# Patient Record
Sex: FEMALE | Race: WHITE | NOT HISPANIC OR LATINO | Employment: FULL TIME | URBAN - METROPOLITAN AREA
[De-identification: names, ages, dates, MRNs, and addresses within clinical notes are randomized per-mention and may not be internally consistent; named-entity substitution may affect disease eponyms.]

---

## 2023-09-19 ENCOUNTER — LAB (OUTPATIENT)
Dept: LAB | Facility: LAB | Age: 33
End: 2023-09-19
Payer: COMMERCIAL

## 2023-09-19 LAB — TOBACCO SCREEN, URINE: NEGATIVE

## 2023-11-09 PROBLEM — N85.00 ENDOMETRIAL HYPERPLASIA: Status: ACTIVE | Noted: 2023-11-09

## 2023-11-09 PROBLEM — E66.01 OBESITY, MORBID (MULTI): Status: ACTIVE | Noted: 2023-11-09

## 2023-11-09 PROBLEM — E28.2 POLYCYSTIC OVARIAN SYNDROME: Status: ACTIVE | Noted: 2023-11-09

## 2023-11-09 PROBLEM — R93.89 INCREASED ENDOMETRIAL STRIPE THICKNESS: Status: ACTIVE | Noted: 2023-11-09

## 2023-11-09 PROBLEM — N91.5 OLIGOMENORRHEA: Status: ACTIVE | Noted: 2023-11-09

## 2023-11-09 PROBLEM — R63.5 WEIGHT GAIN, ABNORMAL: Status: ACTIVE | Noted: 2023-11-09

## 2023-11-09 RX ORDER — NORETHINDRONE 5 MG/1
1 TABLET ORAL DAILY
COMMUNITY
Start: 2021-07-12 | End: 2023-11-10 | Stop reason: WASHOUT

## 2023-11-09 RX ORDER — LETROZOLE 2.5 MG/1
1 TABLET, FILM COATED ORAL DAILY
COMMUNITY
Start: 2021-07-12 | End: 2023-11-10 | Stop reason: WASHOUT

## 2023-11-09 RX ORDER — MULTIVIT-MIN/IRON FUM/FOLIC AC 7.5 MG-4
1 TABLET ORAL DAILY
COMMUNITY

## 2023-11-09 RX ORDER — LATANOPROST 50 UG/ML
SOLUTION/ DROPS OPHTHALMIC
COMMUNITY
Start: 2023-05-24

## 2023-11-09 RX ORDER — LEVOTHYROXINE SODIUM 75 UG/1
75 CAPSULE ORAL
COMMUNITY
End: 2023-11-10 | Stop reason: WASHOUT

## 2023-11-10 ENCOUNTER — LAB (OUTPATIENT)
Dept: LAB | Facility: LAB | Age: 33
End: 2023-11-10
Payer: COMMERCIAL

## 2023-11-10 ENCOUNTER — OFFICE VISIT (OUTPATIENT)
Dept: OBSTETRICS AND GYNECOLOGY | Facility: CLINIC | Age: 33
End: 2023-11-10
Payer: COMMERCIAL

## 2023-11-10 VITALS
BODY MASS INDEX: 41.95 KG/M2 | WEIGHT: 293 LBS | DIASTOLIC BLOOD PRESSURE: 70 MMHG | SYSTOLIC BLOOD PRESSURE: 110 MMHG | HEIGHT: 70 IN

## 2023-11-10 DIAGNOSIS — Z32.02 PREGNANCY TEST NEGATIVE: ICD-10-CM

## 2023-11-10 DIAGNOSIS — L68.0 HIRSUTISM: ICD-10-CM

## 2023-11-10 DIAGNOSIS — L70.9 ACNE, UNSPECIFIED ACNE TYPE: ICD-10-CM

## 2023-11-10 DIAGNOSIS — N85.02 ATYPICAL ENDOMETRIAL HYPERPLASIA: ICD-10-CM

## 2023-11-10 DIAGNOSIS — N93.9 ABNORMAL UTERINE BLEEDING (AUB): ICD-10-CM

## 2023-11-10 DIAGNOSIS — Z12.4 CERVICAL CANCER SCREENING: ICD-10-CM

## 2023-11-10 DIAGNOSIS — E28.2 PCOS (POLYCYSTIC OVARIAN SYNDROME): Primary | ICD-10-CM

## 2023-11-10 LAB
ERYTHROCYTE [DISTWIDTH] IN BLOOD BY AUTOMATED COUNT: 12.5 % (ref 11.5–14.5)
HCT VFR BLD AUTO: 42.1 % (ref 36–46)
HGB BLD-MCNC: 13.6 G/DL (ref 12–16)
MCH RBC QN AUTO: 29 PG (ref 26–34)
MCHC RBC AUTO-ENTMCNC: 32.3 G/DL (ref 32–36)
MCV RBC AUTO: 90 FL (ref 80–100)
NRBC BLD-RTO: 0 /100 WBCS (ref 0–0)
PLATELET # BLD AUTO: 427 X10*3/UL (ref 150–450)
PREGNANCY TEST URINE, POC: NEGATIVE
RBC # BLD AUTO: 4.69 X10*6/UL (ref 4–5.2)
TSH SERPL-ACNC: 2.13 MIU/L (ref 0.44–3.98)
WBC # BLD AUTO: 12.5 X10*3/UL (ref 4.4–11.3)

## 2023-11-10 PROCEDURE — 36415 COLL VENOUS BLD VENIPUNCTURE: CPT

## 2023-11-10 PROCEDURE — 84146 ASSAY OF PROLACTIN: CPT

## 2023-11-10 PROCEDURE — 82627 DEHYDROEPIANDROSTERONE: CPT

## 2023-11-10 PROCEDURE — 84402 ASSAY OF FREE TESTOSTERONE: CPT

## 2023-11-10 PROCEDURE — 88175 CYTOPATH C/V AUTO FLUID REDO: CPT

## 2023-11-10 PROCEDURE — 81025 URINE PREGNANCY TEST: CPT | Performed by: OBSTETRICS & GYNECOLOGY

## 2023-11-10 PROCEDURE — 84443 ASSAY THYROID STIM HORMONE: CPT

## 2023-11-10 PROCEDURE — 99214 OFFICE O/P EST MOD 30 MIN: CPT | Performed by: OBSTETRICS & GYNECOLOGY

## 2023-11-10 PROCEDURE — 83036 HEMOGLOBIN GLYCOSYLATED A1C: CPT

## 2023-11-10 PROCEDURE — 83498 ASY HYDROXYPROGESTERONE 17-D: CPT

## 2023-11-10 PROCEDURE — 87624 HPV HI-RISK TYP POOLED RSLT: CPT

## 2023-11-10 PROCEDURE — 85027 COMPLETE CBC AUTOMATED: CPT

## 2023-11-10 PROCEDURE — 1036F TOBACCO NON-USER: CPT | Performed by: OBSTETRICS & GYNECOLOGY

## 2023-11-10 RX ORDER — ALBUTEROL SULFATE 90 UG/1
AEROSOL, METERED RESPIRATORY (INHALATION)
COMMUNITY
Start: 2023-08-16

## 2023-11-10 NOTE — PROGRESS NOTES
Suze Cohen is a 32 y.o. G0 presents for AUB and annual GYN well woman exam.     AUB: Known PCOS and atypical endometrial hyperplasia.   Bleeding on and off since 8/2023.   BMI 51  Known PCOS and atypical endometrial hyperplasia. Previously had IUD for management of endometrial hyperplasia. She had it removed 7/2021 as she wished to become pregnant.  She was to follow-up in office for continued management if she did not become pregnant.    Last EMB 5/2021 inactive endometrium.   Pt with atypical hyperplasia on EMB and subsequent D&C pathology was the same.   s/p D&C 6/19/19 for further characterization and pathology was the same:  s/p GYN oncology visit with Dr. Hernandez with recs for Mirena IUD (placed 7/2019) and serial EMB's q6 months unless  she becomes symptomatic with concerning bleeding and will be for EMB at that time  Mirena IUD placed 7/1/19.   Per Dr. Hernandez's recommendations, she is for EMB at 4 months and if pathology is reassuring, she will be   serial EMBs negative: 11/2019, 4/2020, 10/2020, 5/2021      Annual GYN well woman exam:  - last pap, pap hx: 4/2019 negative. No h/o abnormal Paps. collected  - last mammogram, breast hx: N/A  - breast abnormalities: negative for lumps, skin changes, nipple discharge, pain     OB hx: Nulligravida  GYN hx:   - menarche, menstrual pattern, LMP: AUB  - Sexual activity/issues, STI protection/history, birth control: Sexually active without issues. No concern for STIs  - vaginal/vulvar lesions, irritation, or abnormal discharge: no  - HPV vaccine: complete  FH: No GYN related cancers including breast, ovarian, endometrial, cervical, or colon cancer.      ROS notable for ***  10 point ROS negative except as listed above.     Physical Exam  Constitutional:       Appearance: Normal appearance.   HENT:      Head: Normocephalic and atraumatic.   Eyes:      Extraocular Movements: Extraocular movements intact.      Conjunctiva/sclera: Conjunctivae normal.       Pupils: Pupils are equal, round, and reactive to light.   Pulmonary:      Effort: Pulmonary effort is normal.   Chest:   Breasts:     Right: Normal.      Left: Normal.   Abdominal:      General: Abdomen is flat.      Palpations: Abdomen is soft.   Genitourinary:     General: Normal vulva.      Vagina: Normal.      Cervix: Normal.      Uterus: Normal.       Adnexa: Right adnexa normal and left adnexa normal.   Lymphadenopathy:      Upper Body:      Left upper body: No supraclavicular adenopathy.   Skin:     General: Skin is warm and dry.   Neurological:      General: No focal deficit present.      Mental Status: She is alert.   Psychiatric:         Mood and Affect: Mood normal.         Behavior: Behavior normal.         Thought Content: Thought content normal.         Judgment: Judgment normal.       Assessment/Plan   AUB: known PCOS, h/o atypical endometrial hyperplasia  -AUB labs ordered  -Needs repeat EMB.  Patient to schedule ASAP  -She changes her insurance with a job change this month.    Annual GYN well woman exam:  - last pap, pap hx: 4/2019 negative. No h/o abnormal Paps. collected  - last mammogram, breast hx: N/A  - breast exam negative today. Discussed breast self awareness.     Scribe Attestation  By signing my name below, ILalitha , [place]   attest that this documentation has been prepared under the direction and in the presence of Aureliano Llamas MD.

## 2023-11-10 NOTE — PROGRESS NOTES
Suze Cohen is a 32 y.o. G0 presents for AUB and annual GYN well woman exam.     AUB:  Bleeding on and off since 8/2023. She bleed every day for a month. A few nights she woke up and bled to the sheets. Sometimes she has to wear a pad and other days she did not need to wear anything. She did not have a period prior to August. She hopes to become pregnant. Last sexual intercourse was today. She was to follow-up in office for continued management if she did not become pregnant.   BMI 51    Known PCOS and atypical endometrial hyperplasia. Previously had IUD for management of endometrial hyperplasia. She had it removed 7/2021 as she wished to become pregnant.     Last EMB 5/2021 inactive endometrium.   Pt with atypical hyperplasia on EMB and subsequent D&C pathology was the same.   s/p D&C 6/19/19 for further characterization and pathology was the same:  s/p GYN oncology visit with Dr. Hernandez with recs for Mirena IUD (placed 7/2019) and serial EMB's q6 months unless  she becomes symptomatic with concerning bleeding and will be for EMB at that time  Mirena IUD placed 7/1/19.   Per Dr. Hernandez's recommendations, she is for EMB at 4 months and if pathology is reassuring, she will be   serial EMBs negative: 11/2019, 4/2020, 10/2020, 5/2021      Annual GYN well woman exam:  - last pap, pap hx: 4/2019 negative. No h/o abnormal Paps. collected  - last mammogram, breast hx: N/A  - breast abnormalities: negative for lumps, skin changes, nipple discharge, pain     OB hx: Nulligravida  GYN hx:   - menarche, menstrual pattern, LMP: AUB  - Sexual activity/issues, STI protection/history, birth control: Sexually active without issues. No concern for STIs  - vaginal/vulvar lesions, irritation, or abnormal discharge: no  - HPV vaccine: complete  FH: No GYN related cancers including breast, ovarian, endometrial, cervical, or colon cancer.      ROS notable for weight gain, fatigue, anxiety, depression and hair loss.   10 point  "ROS negative except as listed above.   /70   Ht 1.778 m (5' 10\")   Wt (!) 162 kg (358 lb)   LMP  (LMP Unknown)   BMI 51.37 kg/m²    Physical Exam  Constitutional:       Appearance: Normal appearance.   HENT:      Head: Normocephalic and atraumatic.   Eyes:      Extraocular Movements: Extraocular movements intact.      Conjunctiva/sclera: Conjunctivae normal.      Pupils: Pupils are equal, round, and reactive to light.   Pulmonary:      Effort: Pulmonary effort is normal.   Chest:   Breasts:     Right: Normal.      Left: Normal.   Abdominal:      General: Abdomen is flat.      Palpations: Abdomen is soft.   Genitourinary:     General: Normal vulva.      Vagina: Normal.      Cervix: Normal.      Uterus: Normal.       Adnexa: Right adnexa normal and left adnexa normal.   Lymphadenopathy:      Upper Body:      Left upper body: No supraclavicular adenopathy.   Skin:     General: Skin is warm and dry.   Neurological:      General: No focal deficit present.      Mental Status: She is alert.   Psychiatric:         Mood and Affect: Mood normal.         Behavior: Behavior normal.         Thought Content: Thought content normal.         Judgment: Judgment normal.       Assessment/Plan   1. Pregnancy test negative  - POC pregnancy, urine    2. PCOS (polycystic ovarian syndrome)      3. Atypical endometrial hyperplasia  -Needs repeat EMB.  Patient to schedule ASAP  - sexually active without contraception, cannot r/o pregnancy at this visit  -She changes her insurance with a job change this month.  -discussed risk of hyperplasia and/or endometrial cancer    4. Abnormal uterine bleeding (AUB)  - CBC; Future  - TSH with reflex to Free T4 if abnormal; Future  - Prolactin; Future  - 17-Hydroxyprogesterone; Future  - DHEA-Sulfate; Future  - Hemoglobin A1C; Future  - Testosterone,Free and Total; Future    5. Hirsutism  - 17-Hydroxyprogesterone; Future  - DHEA-Sulfate; Future  - Hemoglobin A1C; Future  - Testosterone,Free and " Total; Future    6. Acne, unspecified acne type  - 17-Hydroxyprogesterone; Future  - DHEA-Sulfate; Future  - Hemoglobin A1C; Future  - Testosterone,Free and Total; Future    Annual GYN well woman exam:  - last pap, pap hx: 4/2019 negative. No h/o abnormal Paps. collected  - last mammogram, breast hx: N/A  - breast exam negative today. Discussed breast self awareness.     Scribe Attestation  By signing my name below, I, Rio Ruelas   attest that this documentation has been prepared under the direction and in the presence of Aureliano Llamas MD.

## 2023-11-11 LAB
DHEA-S SERPL-MCNC: 221 UG/DL (ref 65–395)
EST. AVERAGE GLUCOSE BLD GHB EST-MCNC: 105 MG/DL
HBA1C MFR BLD: 5.3 %
PROLACTIN SERPL-MCNC: 11.3 UG/L (ref 3–20)

## 2023-11-13 ENCOUNTER — TELEPHONE (OUTPATIENT)
Dept: OBSTETRICS AND GYNECOLOGY | Facility: CLINIC | Age: 33
End: 2023-11-13

## 2023-11-15 LAB — 17OHP SERPL-MCNC: 31.56 NG/DL

## 2023-11-16 LAB
TESTOSTERONE FREE (CHAN): 7.7 PG/ML (ref 0.1–6.4)
TESTOSTERONE,TOTAL,LC-MS/MS: 36 NG/DL (ref 2–45)

## 2023-12-02 LAB
CYTOLOGY CMNT CVX/VAG CYTO-IMP: NORMAL
HPV HR 12 DNA GENITAL QL NAA+PROBE: NEGATIVE
HPV HR GENOTYPES PNL CVX NAA+PROBE: NEGATIVE
HPV16 DNA SPEC QL NAA+PROBE: NEGATIVE
HPV18 DNA SPEC QL NAA+PROBE: NEGATIVE
LAB AP HPV GENOTYPE QUESTION: YES
LAB AP HPV HR: NORMAL
LABORATORY COMMENT REPORT: NORMAL
PATH REPORT.TOTAL CANCER: NORMAL

## 2023-12-18 ENCOUNTER — PROCEDURE VISIT (OUTPATIENT)
Dept: OBSTETRICS AND GYNECOLOGY | Facility: CLINIC | Age: 33
End: 2023-12-18
Payer: COMMERCIAL

## 2023-12-18 VITALS
HEIGHT: 70 IN | DIASTOLIC BLOOD PRESSURE: 62 MMHG | WEIGHT: 293 LBS | SYSTOLIC BLOOD PRESSURE: 110 MMHG | BODY MASS INDEX: 41.95 KG/M2

## 2023-12-18 DIAGNOSIS — Z87.42 HISTORY OF ENDOMETRIAL HYPERPLASIA: ICD-10-CM

## 2023-12-18 DIAGNOSIS — Z32.02 PREGNANCY TEST NEGATIVE: ICD-10-CM

## 2023-12-18 DIAGNOSIS — N93.9 ABNORMAL UTERINE BLEEDING (AUB): Primary | ICD-10-CM

## 2023-12-18 LAB — PREGNANCY TEST URINE, POC: NEGATIVE

## 2023-12-18 PROCEDURE — 88305 TISSUE EXAM BY PATHOLOGIST: CPT

## 2023-12-18 PROCEDURE — 88305 TISSUE EXAM BY PATHOLOGIST: CPT | Performed by: STUDENT IN AN ORGANIZED HEALTH CARE EDUCATION/TRAINING PROGRAM

## 2023-12-18 PROCEDURE — 81025 URINE PREGNANCY TEST: CPT | Performed by: OBSTETRICS & GYNECOLOGY

## 2023-12-18 PROCEDURE — 58100 BIOPSY OF UTERUS LINING: CPT | Performed by: OBSTETRICS & GYNECOLOGY

## 2023-12-18 NOTE — PROGRESS NOTES
Suze Cohen is a 33 y.o. presenting today for EMB.    Denies unprotected sex in the past two weeks.  Insurance coverage until at least the end of the year.       Last Visit - 12/18/2023  AUB:  Bleeding on and off since 8/2023. She bleed every day for a month. A few nights she woke up and bled to the sheets. Sometimes she has to wear a pad and other days she did not need to wear anything. She did not have a period prior to August. She hopes to become pregnant. Last sexual intercourse was today. She was to follow-up in office for continued management if she did not become pregnant.   BMI 51     Known PCOS and atypical endometrial hyperplasia. Previously had IUD for management of endometrial hyperplasia. She had it removed 7/2021 as she wished to become pregnant.      Last EMB 5/2021 inactive endometrium.   Pt with atypical hyperplasia on EMB and subsequent D&C pathology was the same.   s/p D&C 6/19/19 for further characterization and pathology was the same:  s/p GYN oncology visit with Dr. Hernandez with recs for Mirena IUD (placed 7/2019) and serial EMB's q6 months unless  she becomes symptomatic with concerning bleeding and will be for EMB at that time  Mirena IUD placed 7/1/19.   Per Dr. Hernandez's recommendations, she is for EMB at 4 months and if pathology is reassuring, she will be   serial EMBs negative: 11/2019, 4/2020, 10/2020, 5/2021        Annual GYN well woman exam:  - last pap, pap hx: 4/2019 negative. No h/o abnormal Paps. collected  - last mammogram, breast hx: N/A  - breast abnormalities: negative for lumps, skin changes, nipple discharge, pain     OB hx: Nulligravida  GYN hx:   - menarche, menstrual pattern, LMP: AUB  - Sexual activity/issues, STI protection/history, birth control: Sexually active without issues. No concern for STIs  - vaginal/vulvar lesions, irritation, or abnormal discharge: no  - HPV vaccine: complete  FH: No GYN related cancers including breast, ovarian, endometrial,  cervical, or colon cancer.    Endometrial biopsy    Date/Time: 12/18/2023 1:22 AM    Performed by: Aureliano Llamas MD  Authorized by: Aureliano Llamas MD    Consent:     Consent obtained: verbal and written    Consent given by: patient    Patient agrees, verbalizes understanding, and wants to proceed: yes      Procedure explained and questions answered to patient or proxy's satisfaction: yes    Pre-procedure:     Urine pregnancy test: negative    Procedure:     A bimanual exam was performed: yes      Prepped with: Betadine    Tenaculum used: yes      A local block was performed: yes      Block method: intracervical      Local anesthetic: lidocaine 1% w/o epi    Amount used (mL): 1    Number of passes: 3  Findings:     Cervix: normal      Specimen collected: specimen collected and sent to pathology      Patient tolerance: tolerated well, no immediate complications    Assessment/Plan   AUB in setting of PCOS - BMI 55; history of atypical endometrial hyperplasia.  - Endometrial biopsy complete, patient tolerated well.  - If bleeding through pads, call the office.  - RTC for result review in 3 weeks.  - If need to cancel due to insurance, call the office for Dr. Llamas.  - Future management may include D&C for further characterization given patient history.    Scribe Attestation  By signing my name below, IAnyi Scribe   attest that this documentation has been prepared under the direction and in the presence of Aureliano Llamas MD.    Scribe Attestation  By signing my name below, Lina HEALY Scribe   attthalia that this documentation has been prepared under the direction and in the presence of Aureliano Llamas MD.

## 2024-01-02 LAB
LABORATORY COMMENT REPORT: NORMAL
PATH REPORT.FINAL DX SPEC: NORMAL
PATH REPORT.GROSS SPEC: NORMAL
PATH REPORT.RELEVANT HX SPEC: NORMAL
PATH REPORT.TOTAL CANCER: NORMAL

## 2024-01-12 ENCOUNTER — OFFICE VISIT (OUTPATIENT)
Dept: OBSTETRICS AND GYNECOLOGY | Facility: CLINIC | Age: 34
End: 2024-01-12
Payer: COMMERCIAL

## 2024-01-12 VITALS
BODY MASS INDEX: 41.95 KG/M2 | HEIGHT: 70 IN | DIASTOLIC BLOOD PRESSURE: 62 MMHG | SYSTOLIC BLOOD PRESSURE: 110 MMHG | WEIGHT: 293 LBS

## 2024-01-12 DIAGNOSIS — N84.0 ENDOMETRIAL POLYP: ICD-10-CM

## 2024-01-12 DIAGNOSIS — N85.00 ENDOMETRIAL HYPERPLASIA: Primary | ICD-10-CM

## 2024-01-12 PROCEDURE — 99214 OFFICE O/P EST MOD 30 MIN: CPT | Performed by: OBSTETRICS & GYNECOLOGY

## 2024-01-12 PROCEDURE — 1036F TOBACCO NON-USER: CPT | Performed by: OBSTETRICS & GYNECOLOGY

## 2024-01-12 NOTE — PROGRESS NOTES
HPI  Suze Cohen is a 33 y.o.  with a history of AUB who is presenting today for follow-up after EMB diagnosis and management plan. Significant h/o atypical endometrial hyperplasia.   No acute concerns.  No pain or bleeding issues after EMB.      Endometrium, biopsy:12/18/2023  -- Endometrial hyperplasia without atypia  -- Features suggestive of endometrial polyp      AUB Hx: 11/10/2023  AUB:  Bleeding on and off since 8/2023. She bleed every day for a month. A few nights she woke up and bled to the sheets. Sometimes she has to wear a pad and other days she did not need to wear anything. She did not have a period prior to August. She hopes to become pregnant. Last sexual intercourse was today. She was to follow-up in office for continued management if she did not become pregnant.   BMI 51     Known PCOS and atypical endometrial hyperplasia. Previously had IUD for management of endometrial hyperplasia. She had it removed 7/2021 as she wished to become pregnant.      Last EMB 5/2021 inactive endometrium.   Pt with atypical hyperplasia on EMB and subsequent D&C pathology was the same.   s/p D&C 6/19/19 for further characterization and pathology was the same:  s/p GYN oncology visit with Dr. Hernandez with recs for Mirena IUD (placed 7/2019) and serial EMB's q6 months unless  she becomes symptomatic with concerning bleeding and will be for EMB at that time  Mirena IUD placed 7/1/19.   Per Dr. Hernandez's recommendations, she is for EMB at 4 months and if pathology is reassuring, she will be   serial EMBs negative: 11/2019, 4/2020, 10/2020, 5/2021     OB hx: Nulligravida  GYN hx:   - menarche, menstrual pattern, LMP: AUB  - Sexual activity/issues, STI protection/history, birth control: Sexually active without issues. No concern for STIs  - vaginal/vulvar lesions, irritation, or abnormal discharge: no  - HPV vaccine: complete  FH: No GYN related cancers including breast, ovarian, endometrial, cervical, or colon  "cancer.     Review of Systems   All other systems reviewed and are negative.      VITAL SIGNS  /62   Ht 1.778 m (5' 10\")   Wt (!) 160 kg (352 lb)   LMP 01/01/2023 (Exact Date)   BMI 50.51 kg/m²     Physical Exam  Constitutional:       Appearance: Normal appearance.   HENT:      Head: Normocephalic and atraumatic.   Eyes:      Extraocular Movements: Extraocular movements intact.      Conjunctiva/sclera: Conjunctivae normal.      Pupils: Pupils are equal, round, and reactive to light.   Pulmonary:      Effort: Pulmonary effort is normal.   Skin:     General: Skin is dry.   Neurological:      General: No focal deficit present.      Mental Status: She is alert.   Psychiatric:         Mood and Affect: Mood normal.         Behavior: Behavior normal.         Thought Content: Thought content normal.         Judgment: Judgment normal.         Assessment/Plan   AUB:  Discussed the EMB results  Recommended Hysteroscopy and D/C for further management. Discussed risks, benefits and complications.Patient in agreement and informed consent was obtained today.     Scribe Attestation  By signing my name below, IAnyi Scribe   attest that this documentation has been prepared under the direction and in the presence of Aureliano Llamas MD.  "

## 2024-01-14 PROBLEM — N93.9 ABNORMAL UTERINE BLEEDING (AUB): Status: ACTIVE | Noted: 2024-01-14

## 2024-01-14 PROBLEM — Z32.02 PREGNANCY TEST NEGATIVE: Status: ACTIVE | Noted: 2024-01-14

## 2024-01-14 PROBLEM — Z87.42 HISTORY OF ENDOMETRIAL HYPERPLASIA: Status: ACTIVE | Noted: 2024-01-14

## 2024-01-31 ENCOUNTER — PREP FOR PROCEDURE (OUTPATIENT)
Dept: OBSTETRICS AND GYNECOLOGY | Facility: HOSPITAL | Age: 34
End: 2024-01-31
Payer: COMMERCIAL

## 2024-01-31 DIAGNOSIS — N84.0 ENDOMETRIAL POLYP: ICD-10-CM

## 2024-01-31 DIAGNOSIS — N85.00 ENDOMETRIAL HYPERPLASIA: Primary | ICD-10-CM

## 2024-01-31 NOTE — PROGRESS NOTES
Suze Cohen is a 33 y.o.  with a history of AUB who is presenting today for hysteroscopy, D&C, possible polypectomy in the setting of recent EMB diagnosis of endometrial hyperplasia  Significant h/o atypical endometrial hyperplasia.   No acute concerns.  No pain or bleeding issues after EMB.       Endometrium, biopsy:12/18/2023  -- Endometrial hyperplasia without atypia  -- Features suggestive of endometrial polyp        AUB Hx: 11/10/2023  AUB:  Bleeding on and off since 8/2023. She bleed every day for a month. A few nights she woke up and bled to the sheets. Sometimes she has to wear a pad and other days she did not need to wear anything. She did not have a period prior to August. She hopes to become pregnant. Last sexual intercourse was today. She was to follow-up in office for continued management if she did not become pregnant.   BMI 51     Known PCOS and atypical endometrial hyperplasia. Previously had IUD for management of endometrial hyperplasia. She had it removed 7/2021 as she wished to become pregnant.      Last EMB 5/2021 inactive endometrium.   Pt with atypical hyperplasia on EMB and subsequent D&C pathology was the same.   s/p D&C 6/19/19 for further characterization and pathology was the same:  s/p GYN oncology visit with Dr. Hernandez with recs for Mirena IUD (placed 7/2019) and serial EMB's q6 months unless  she becomes symptomatic with concerning bleeding and will be for EMB at that time  Mirena IUD placed 7/1/19.   Per Dr. Hernandez's recommendations, she is for EMB at 4 months and if pathology is reassuring, she will be   serial EMBs negative: 11/2019, 4/2020, 10/2020, 5/2021     OB hx: Nulligravida  GYN hx:   - menarche, menstrual pattern, LMP: AUB  - Sexual activity/issues, STI protection/history, birth control: Sexually active without issues. No concern for STIs  - vaginal/vulvar lesions, irritation, or abnormal discharge: no  - HPV vaccine: complete  FH: No GYN related cancers including  breast, ovarian, endometrial, cervical, or colon cancer.     LMP 01/01/2023 (Exact Date)      Physical Exam  Constitutional:       Appearance: Normal appearance.   HENT:      Head: Normocephalic and atraumatic.   Eyes:      Extraocular Movements: Extraocular movements intact.      Conjunctiva/sclera: Conjunctivae normal.      Pupils: Pupils are equal, round, and reactive to light.   Pulmonary:      Effort: Pulmonary effort is normal.   Skin:     General: Skin is dry.   Neurological:      General: No focal deficit present.      Mental Status: She is alert.   Psychiatric:         Mood and Affect: Mood normal.         Behavior: Behavior normal.         Thought Content: Thought content normal.         Judgment: Judgment normal.          For hysteroscopy, D&C in the setting of endometrial hyperplasia and polyp  Proceed to OR  No antibiotics indicated

## 2024-03-01 ENCOUNTER — TELEPHONE (OUTPATIENT)
Dept: PREADMISSION TESTING | Facility: HOSPITAL | Age: 34
End: 2024-03-01
Payer: COMMERCIAL

## 2024-03-01 NOTE — TELEPHONE ENCOUNTER
SURGERY PRE-OPERATIVE INSTRUCTIONS    *You will receive a phone call the day before your procedure  after 2pm, (or the Friday before your surgery if scheduled on a Monday.) Generally the hospital will be calling you with this information after that time.    *You are not to eat after midnight the night before the surgery. You may have 8oz of a clear liquid up until 2 hours prior to arriving to the hospital. The exception is with medications you were instructed to take day of surgery.    *You may take tylenol for pain/discomfort as needed.     *Stop taking all aspirin products, ibuprofen (motrin/advil), naproxen (aleve/naprosyn) for one week prior to surgery.    *Stop taking all vitamins and supplements one week prior to surgery.     *You should not have alcoholic beverages for 24 hours before surgery.     *You should not smoke 24 hours prior to surgery.     *To help prevent surgical infections bathe/shower with Dial soap the evening before surgery.    *You can wear deodorant but no lotion, powder, or perfume/cologne. You should remove all make-up and nail polish at home.    *If you wear glasses, please bring a case for the glasses with you.    *You will be asked to remove dentures and contacts.     *Please leave all valuables at home.    *You should wear loose, comfortable clothing that will accommodate bandages and/or casts.    *You should notify your doctor of any change in your condition (fever, cold, rash, etc). Surgery may need to be re-scheduled until a time you are in better health.    *A responsible adult is required to accompany you to and from the hospital if you are receiving anesthesia or a sedative. Patients are not permitted to drive for 24 hours after anesthesia.     *You can use the [facility] parking if you wish.     *If you have any further questions please call [phone].

## 2024-03-04 ENCOUNTER — ANESTHESIA EVENT (OUTPATIENT)
Dept: OPERATING ROOM | Facility: HOSPITAL | Age: 34
End: 2024-03-04
Payer: COMMERCIAL

## 2024-03-06 ENCOUNTER — ANESTHESIA (OUTPATIENT)
Dept: OPERATING ROOM | Facility: HOSPITAL | Age: 34
End: 2024-03-06
Payer: COMMERCIAL

## 2024-03-06 ENCOUNTER — HOSPITAL ENCOUNTER (OUTPATIENT)
Facility: HOSPITAL | Age: 34
Setting detail: OUTPATIENT SURGERY
Discharge: HOME | End: 2024-03-06
Attending: OBSTETRICS & GYNECOLOGY | Admitting: OBSTETRICS & GYNECOLOGY
Payer: COMMERCIAL

## 2024-03-06 VITALS
HEIGHT: 70 IN | DIASTOLIC BLOOD PRESSURE: 85 MMHG | WEIGHT: 293 LBS | RESPIRATION RATE: 20 BRPM | OXYGEN SATURATION: 94 % | TEMPERATURE: 98.2 F | SYSTOLIC BLOOD PRESSURE: 155 MMHG | BODY MASS INDEX: 41.95 KG/M2 | HEART RATE: 93 BPM

## 2024-03-06 DIAGNOSIS — N84.0 ENDOMETRIAL POLYP: ICD-10-CM

## 2024-03-06 DIAGNOSIS — N85.00 ENDOMETRIAL HYPERPLASIA: ICD-10-CM

## 2024-03-06 PROBLEM — H40.9 GLAUCOMA: Status: ACTIVE | Noted: 2024-03-06

## 2024-03-06 PROBLEM — K21.9 GASTROESOPHAGEAL REFLUX DISEASE: Status: ACTIVE | Noted: 2024-03-06

## 2024-03-06 LAB — PREGNANCY TEST URINE, POC: NEGATIVE

## 2024-03-06 PROCEDURE — A58558 PR HYSTEROSCOPY,W/ENDO BX: Performed by: NURSE ANESTHETIST, CERTIFIED REGISTERED

## 2024-03-06 PROCEDURE — 3700000001 HC GENERAL ANESTHESIA TIME - INITIAL BASE CHARGE: Performed by: OBSTETRICS & GYNECOLOGY

## 2024-03-06 PROCEDURE — 3600000008 HC OR TIME - EACH INCREMENTAL 1 MINUTE - PROCEDURE LEVEL THREE: Performed by: OBSTETRICS & GYNECOLOGY

## 2024-03-06 PROCEDURE — A4217 STERILE WATER/SALINE, 500 ML: HCPCS | Performed by: OBSTETRICS & GYNECOLOGY

## 2024-03-06 PROCEDURE — C1782 MORCELLATOR: HCPCS | Performed by: OBSTETRICS & GYNECOLOGY

## 2024-03-06 PROCEDURE — 2500000004 HC RX 250 GENERAL PHARMACY W/ HCPCS (ALT 636 FOR OP/ED)

## 2024-03-06 PROCEDURE — 7100000010 HC PHASE TWO TIME - EACH INCREMENTAL 1 MINUTE: Performed by: OBSTETRICS & GYNECOLOGY

## 2024-03-06 PROCEDURE — 2500000004 HC RX 250 GENERAL PHARMACY W/ HCPCS (ALT 636 FOR OP/ED): Performed by: NURSE ANESTHETIST, CERTIFIED REGISTERED

## 2024-03-06 PROCEDURE — 3600000003 HC OR TIME - INITIAL BASE CHARGE - PROCEDURE LEVEL THREE: Performed by: OBSTETRICS & GYNECOLOGY

## 2024-03-06 PROCEDURE — 3700000002 HC GENERAL ANESTHESIA TIME - EACH INCREMENTAL 1 MINUTE: Performed by: OBSTETRICS & GYNECOLOGY

## 2024-03-06 PROCEDURE — 7100000002 HC RECOVERY ROOM TIME - EACH INCREMENTAL 1 MINUTE: Performed by: OBSTETRICS & GYNECOLOGY

## 2024-03-06 PROCEDURE — 7100000009 HC PHASE TWO TIME - INITIAL BASE CHARGE: Performed by: OBSTETRICS & GYNECOLOGY

## 2024-03-06 PROCEDURE — 2500000004 HC RX 250 GENERAL PHARMACY W/ HCPCS (ALT 636 FOR OP/ED): Performed by: ANESTHESIOLOGY

## 2024-03-06 PROCEDURE — 7100000001 HC RECOVERY ROOM TIME - INITIAL BASE CHARGE: Performed by: OBSTETRICS & GYNECOLOGY

## 2024-03-06 PROCEDURE — 2500000005 HC RX 250 GENERAL PHARMACY W/O HCPCS: Performed by: NURSE ANESTHETIST, CERTIFIED REGISTERED

## 2024-03-06 PROCEDURE — 88305 TISSUE EXAM BY PATHOLOGIST: CPT | Mod: TC,GEALAB | Performed by: OBSTETRICS & GYNECOLOGY

## 2024-03-06 PROCEDURE — 88305 TISSUE EXAM BY PATHOLOGIST: CPT | Performed by: PATHOLOGY

## 2024-03-06 PROCEDURE — 2720000007 HC OR 272 NO HCPCS: Performed by: OBSTETRICS & GYNECOLOGY

## 2024-03-06 PROCEDURE — 2500000004 HC RX 250 GENERAL PHARMACY W/ HCPCS (ALT 636 FOR OP/ED): Performed by: OBSTETRICS & GYNECOLOGY

## 2024-03-06 RX ORDER — FENTANYL CITRATE 50 UG/ML
INJECTION, SOLUTION INTRAMUSCULAR; INTRAVENOUS AS NEEDED
Status: DISCONTINUED | OUTPATIENT
Start: 2024-03-06 | End: 2024-03-06

## 2024-03-06 RX ORDER — ONDANSETRON HYDROCHLORIDE 2 MG/ML
INJECTION, SOLUTION INTRAVENOUS AS NEEDED
Status: DISCONTINUED | OUTPATIENT
Start: 2024-03-06 | End: 2024-03-06

## 2024-03-06 RX ORDER — DROPERIDOL 2.5 MG/ML
0.62 INJECTION, SOLUTION INTRAMUSCULAR; INTRAVENOUS ONCE AS NEEDED
Status: COMPLETED | OUTPATIENT
Start: 2024-03-06 | End: 2024-03-06

## 2024-03-06 RX ORDER — KETOROLAC TROMETHAMINE 30 MG/ML
INJECTION, SOLUTION INTRAMUSCULAR; INTRAVENOUS AS NEEDED
Status: DISCONTINUED | OUTPATIENT
Start: 2024-03-06 | End: 2024-03-06

## 2024-03-06 RX ORDER — PROPOFOL 10 MG/ML
INJECTION, EMULSION INTRAVENOUS AS NEEDED
Status: DISCONTINUED | OUTPATIENT
Start: 2024-03-06 | End: 2024-03-06

## 2024-03-06 RX ORDER — MIDAZOLAM HYDROCHLORIDE 1 MG/ML
INJECTION INTRAMUSCULAR; INTRAVENOUS AS NEEDED
Status: DISCONTINUED | OUTPATIENT
Start: 2024-03-06 | End: 2024-03-06

## 2024-03-06 RX ORDER — LIDOCAINE HYDROCHLORIDE 20 MG/ML
INJECTION, SOLUTION INFILTRATION; PERINEURAL AS NEEDED
Status: DISCONTINUED | OUTPATIENT
Start: 2024-03-06 | End: 2024-03-06

## 2024-03-06 RX ORDER — OXYCODONE HYDROCHLORIDE 5 MG/1
5 TABLET ORAL EVERY 4 HOURS PRN
Status: DISCONTINUED | OUTPATIENT
Start: 2024-03-06 | End: 2024-03-06 | Stop reason: HOSPADM

## 2024-03-06 RX ORDER — ONDANSETRON HYDROCHLORIDE 2 MG/ML
4 INJECTION, SOLUTION INTRAVENOUS ONCE AS NEEDED
Status: DISCONTINUED | OUTPATIENT
Start: 2024-03-06 | End: 2024-03-06 | Stop reason: HOSPADM

## 2024-03-06 RX ORDER — SODIUM CHLORIDE 0.9 G/100ML
IRRIGANT IRRIGATION AS NEEDED
Status: DISCONTINUED | OUTPATIENT
Start: 2024-03-06 | End: 2024-03-06 | Stop reason: HOSPADM

## 2024-03-06 RX ORDER — SODIUM CHLORIDE, SODIUM LACTATE, POTASSIUM CHLORIDE, CALCIUM CHLORIDE 600; 310; 30; 20 MG/100ML; MG/100ML; MG/100ML; MG/100ML
100 INJECTION, SOLUTION INTRAVENOUS CONTINUOUS
Status: DISCONTINUED | OUTPATIENT
Start: 2024-03-06 | End: 2024-03-06 | Stop reason: HOSPADM

## 2024-03-06 RX ADMIN — SODIUM CHLORIDE, POTASSIUM CHLORIDE, SODIUM LACTATE AND CALCIUM CHLORIDE 100 ML/HR: 600; 310; 30; 20 INJECTION, SOLUTION INTRAVENOUS at 07:15

## 2024-03-06 RX ADMIN — FENTANYL CITRATE 50 MCG: 50 INJECTION, SOLUTION INTRAMUSCULAR; INTRAVENOUS at 08:37

## 2024-03-06 RX ADMIN — LIDOCAINE HYDROCHLORIDE 80 MG: 20 INJECTION, SOLUTION INFILTRATION; PERINEURAL at 07:47

## 2024-03-06 RX ADMIN — ONDANSETRON 4 MG: 2 INJECTION INTRAMUSCULAR; INTRAVENOUS at 07:52

## 2024-03-06 RX ADMIN — FENTANYL CITRATE 50 MCG: 50 INJECTION, SOLUTION INTRAMUSCULAR; INTRAVENOUS at 08:39

## 2024-03-06 RX ADMIN — FENTANYL CITRATE 50 MCG: 50 INJECTION, SOLUTION INTRAMUSCULAR; INTRAVENOUS at 07:47

## 2024-03-06 RX ADMIN — FENTANYL CITRATE 50 MCG: 50 INJECTION, SOLUTION INTRAMUSCULAR; INTRAVENOUS at 08:00

## 2024-03-06 RX ADMIN — MIDAZOLAM HYDROCHLORIDE 2 MG: 1 INJECTION, SOLUTION INTRAMUSCULAR; INTRAVENOUS at 07:39

## 2024-03-06 RX ADMIN — KETOROLAC TROMETHAMINE 30 MG: 30 INJECTION, SOLUTION INTRAMUSCULAR at 08:38

## 2024-03-06 RX ADMIN — DROPERIDOL 0.62 MG: 2.5 INJECTION, SOLUTION INTRAMUSCULAR; INTRAVENOUS at 09:00

## 2024-03-06 RX ADMIN — PROPOFOL 200 MG: 10 INJECTION, EMULSION INTRAVENOUS at 07:47

## 2024-03-06 SDOH — HEALTH STABILITY: MENTAL HEALTH: CURRENT SMOKER: 0

## 2024-03-06 ASSESSMENT — PAIN SCALES - GENERAL
PAINLEVEL_OUTOF10: 3
PAINLEVEL_OUTOF10: 4
PAINLEVEL_OUTOF10: 0 - NO PAIN
PAINLEVEL_OUTOF10: 3

## 2024-03-06 ASSESSMENT — COLUMBIA-SUICIDE SEVERITY RATING SCALE - C-SSRS
6. HAVE YOU EVER DONE ANYTHING, STARTED TO DO ANYTHING, OR PREPARED TO DO ANYTHING TO END YOUR LIFE?: NO
2. HAVE YOU ACTUALLY HAD ANY THOUGHTS OF KILLING YOURSELF?: NO
1. IN THE PAST MONTH, HAVE YOU WISHED YOU WERE DEAD OR WISHED YOU COULD GO TO SLEEP AND NOT WAKE UP?: NO

## 2024-03-06 ASSESSMENT — PAIN - FUNCTIONAL ASSESSMENT
PAIN_FUNCTIONAL_ASSESSMENT: 0-10
PAIN_FUNCTIONAL_ASSESSMENT: 0-10

## 2024-03-06 ASSESSMENT — PAIN DESCRIPTION - DESCRIPTORS: DESCRIPTORS: CRAMPING

## 2024-03-06 NOTE — ANESTHESIA PREPROCEDURE EVALUATION
Patient: Suze Cohen    Procedure Information       Date/Time: 03/06/24 0730    Procedure: HYSTEROSCOPY D&C, POSSIBLE POLYPECTOMY - MYOSURE    Location: [facility] / [facility]    Surgeons: Aureliano Llamas MD            Relevant Problems   Anesthesia (within normal limits)      Endocrine   (+) Obesity, morbid (CMS/[facility])      GI   (+) Gastroesophageal reflux disease (Diet related)      /Renal (within normal limits)      Pulmonary (within normal limits)      GI/Hepatic (within normal limits)      Eyes, Ears, Nose, and Throat   (+) Glaucoma     Vitals:    03/06/24 0708   BP: 120/70   Pulse:    Resp:    Temp:    SpO2:        Past Surgical History:   Procedure Laterality Date    DILATION AND CURETTAGE OF UTERUS      HYSTEROSCOPY       Past Medical History:   Diagnosis Date    Class 3 severe obesity with body mass index (BMI) of 50.0 to 59.9 in adult (CMS/[facility]) 01/12/2024    50.51 kg/m²    Encounter for pregnancy test, result unknown 04/06/2020    Encounter for pregnancy test    Endometrial hyperplasia     Endometrial polyp     Other specified anxiety disorders 08/29/2016    Depression with anxiety    Personal history of other diseases of the female genital tract     History of polycystic ovarian syndrome    Personal history of other endocrine, nutritional and metabolic disease     History of morbid obesity    Personal history of other infectious and parasitic diseases     History of chickenpox       Current Facility-Administered Medications:     lactated Ringer's infusion, 100 mL/hr, intravenous, Continuous, Colin Davalos MD, Last Rate: 100 mL/hr at 03/06/24 0715, 100 mL/hr at 03/06/24 0715  Prior to Admission medications    Medication Sig Start Date End Date Taking? Authorizing Provider   latanoprost (Xalatan) 0.005 % ophthalmic solution  5/24/23  Yes Historical Provider, MD   albuterol 90 mcg/actuation inhaler INHALE 1 TO 2 PUFFS EVERY 4 TO 6 HOURS AS NEEDED FOR WHEEZE FOR UP TO 30 DAYS 8/16/23    "Historical Provider, MD   L. acidophilus/Bifid. animalis 32 billion cell capsule Take by mouth.    Historical Provider, MD   multivitamin with minerals tablet Take 1 tablet by mouth once daily. HAIR, SKIN AND NAILS FORMULA    Historical Provider, MD     Allergies   Allergen Reactions    Neuromuscular Blockers, Steroidal Other     Hypertension in eyes     Social History     Tobacco Use    Smoking status: Never    Smokeless tobacco: Never   Substance Use Topics    Alcohol use: Yes     Comment: SOCIALLY         Chemistry    No results found for: \"NA\", \"K\", \"CL\", \"CO2\", \"BUN\", \"CREATININE\", \"GLU\" No results found for: \"CALCIUM\", \"ALKPHOS\", \"AST\", \"ALT\", \"BILITOT\"       Lab Results   Component Value Date/Time    WBC 12.5 (H) 11/10/2023 1606    HGB 13.6 11/10/2023 1606    HCT 42.1 11/10/2023 1606     11/10/2023 1606       Clinical information reviewed:   Tobacco  Allergies  Meds   Med Hx  Surg Hx   Fam Hx  Soc Hx        NPO Detail:  NPO/Void Status  Date of Last Liquid: 03/05/24  Date of Last Solid: 03/05/24         Physical Exam    Airway  Mallampati: I  TM distance: >3 FB  Neck ROM: full     Cardiovascular   Rhythm: regular     Dental    Pulmonary   Breath sounds clear to auscultation     Abdominal        HCG negative    Anesthesia Plan    History of general anesthesia?: yes  History of complications of general anesthesia?: no    ASA 3     general     The patient is not a current smoker.    intravenous induction   Trial extubation is planned.  Anesthetic plan and risks discussed with patient.  Use of blood products discussed with patient who consented to blood products.    Plan discussed with CRNA.      "

## 2024-03-06 NOTE — H&P
33 y.o. with a history of AUB who is presenting today for hysteroscopy, D&C, possible polypectomy in the setting of recent EMB diagnosis of endometrial hyperplasia  Significant h/o atypical endometrial hyperplasia.   No acute concerns.  No unprotected sex for > 2 wks prior to procedure.       Endometrium, biopsy:12/18/2023  -- Endometrial hyperplasia without atypia  -- Features suggestive of endometrial polyp     AUB Hx: 11/10/2023  AUB:  Bleeding on and off since 8/2023. She bleed every day for a month. A few nights she woke up and bled to the sheets. Sometimes she has to wear a pad and other days she did not need to wear anything. She did not have a period prior to August. She hopes to become pregnant. Last sexual intercourse was today. She was to follow-up in office for continued management if she did not become pregnant.   BMI 51     Known PCOS and atypical endometrial hyperplasia. Previously had IUD for management of endometrial hyperplasia. She had it removed 7/2021 as she wished to become pregnant.      EMB 5/2021 inactive endometrium.   Pt with atypical hyperplasia on EMB and subsequent D&C pathology was the same.   s/p D&C 6/19/19 for further characterization and pathology was the same:  s/p GYN oncology visit with Dr. Hernandez with recs for Mirena IUD (placed 7/2019) and serial EMB's q6 months unless  she becomes symptomatic with concerning bleeding and will be for EMB at that time  Mirena IUD placed 7/1/19.   Per Dr. Hernandez's recommendations, she is for EMB at 4 months and if pathology is reassuring, she will be   serial EMBs negative: 11/2019, 4/2020, 10/2020, 5/2021     OB hx: Nulligravida  GYN hx:   - menarche, menstrual pattern, LMP: AUB  - Sexual activity/issues, STI protection/history, birth control: Sexually active without issues. No concern for STIs  - vaginal/vulvar lesions, irritation, or abnormal discharge: no  - HPV vaccine: complete  FH: No GYN related cancers including breast, ovarian,  endometrial, cervical, or colon cancer.        Physical Exam  Constitutional:       Appearance: Normal appearance.   HENT:      Head: Normocephalic and atraumatic.   Eyes:      Extraocular Movements: Extraocular movements intact.      Conjunctiva/sclera: Conjunctivae normal.      Pupils: Pupils are equal, round, and reactive to light.   Pulmonary:      Effort: Pulmonary effort is normal.   Skin:     General: Skin is dry.   Neurological:      General: No focal deficit present.      Mental Status: She is alert.   Psychiatric:         Mood and Affect: Mood normal.         Behavior: Behavior normal.         Thought Content: Thought content normal.         Judgment: Judgment normal.       For hysteroscopy, D&C in the setting of endometrial hyperplasia and polyp  Proceed to OR  No antibiotics indicated

## 2024-03-06 NOTE — BRIEF OP NOTE
Date: 3/6/2024  OR Location: [facility] OR    Name: Suze Cohen, : 1990, Age: 33 y.o., MRN: [id number], Sex: female    Diagnosis  Pre-op Diagnosis     * Endometrial hyperplasia [N85.00]     * Endometrial polyp [N84.0] Post-op Diagnosis     * Endometrial hyperplasia [N85.00]     * Endometrial polyp [N84.0]     Procedures  HYSTEROSCOPY D&C, POLYPECTOMY WITH MYOSURE  11740 - UT HYSTEROSCOPY BX ENDOMETRIUM&/POLYPC W/WO D&C    UT HYSTEROSCOPY BX ENDOMETRIUM&/POLYPC W/WO D&C [58401]  Surgeons      * Aureliano SWEET Llamas - Primary    Resident/Fellow/Other Assistant:  Surgeon(s) and Role: n/a    Procedure Summary  Anesthesia: Consult  ASA: III  Anesthesia Staff: CRNA: Rachael Song, JERO-CRNA; Maureen Rodarte APRN-CRNA  Estimated Blood Loss: 5mL  Total fluid deficit: 200mL normal saline  Findings: fluffy endometrium with multiple polyps    The patient was taken to the operating room where anesthesia was found to be adequate. She was placed in the dorsal supine position with a leftward tilt. SCDs were placed. Vaginal prep was performed.   A speculum was inserted into the vagina and the anterior lip was grasped with a single tooth tenaculum. Her cervix was serially dilated to allow passage of the hysteroscope. Findings noted as above.   The Myosure device was used to remove the polyps under direct visualization. Following this, gentle endometrial curettage was then performed with a sharp curette until uterine cry was noted in all four quadrants. Specimen was sent together to pathology. All instruments were removed from the vagina. The patient tolerated the procedure well. All counts were correct per nursing staff. The patient was returned to the recovery room in stable condition.     Intra-op Medications:   Administrations occurring from 0730 to 0845 on 24:   Medication Name Total Dose   lactated Ringer's infusion Cannot be calculated              Anesthesia Record               Intraprocedure I/O Totals           Intake    lactated Ringer's infusion 600.00 mL    Total Intake 600 mL       Output    Est. Blood Loss 5 mL    Total Output 5 mL       Net    Net Volume 595 mL          Specimen:   ID Type Source Tests Collected by Time   1 : Endometrial curretings and polyps Tissue ENDOMETRIUM POLYPECTOMY SURGICAL PATHOLOGY EXAM Aureliano Llamas MD 3/6/2024 2931        Staff:   Circulator: Steffany Antoine RN; Stefanie Martin RN  Scrub Person: Sunday Ramos    Complications:  None; patient tolerated the procedure well.     Disposition: PACU - hemodynamically stable.  Condition: stable  Specimens Collected:   ID Type Source Tests Collected by Time   1 : Endometrial curretings and polyps Tissue ENDOMETRIUM POLYPECTOMY SURGICAL PATHOLOGY EXAM Aureliano Llamas MD 3/6/2024 5994     Attending Attestation: I performed the procedure.    Aureliano Llamas  Phone Number: [phone]

## 2024-03-06 NOTE — DISCHARGE INSTRUCTIONS
[facility]  [address], [place] [address]  [address], [place] [address]  Tel: [phone] ([place]) or [phone] ([place])    Home Going Instructions after Hysteroscopy/D&C:    Activity:   We do not recommend any exercise on the day of your procedure.  You may return to work the following day.  You may have light bleeding or spotting for several days after procedure.   Use only sanitary pads for bleeding, do not use tampons until you have no further bleeding.   Please abstain from intercourse until you have no further bleeding.     Anesthesia:  Drink small amounts of liquids initially and then slowly increase your intake of food. Drinking fluids will keep your bowels regular.   Avoid foods that are sweet, spicy or hard to digest today.  If you feel nauseated, rest your stomach for one hour, and then try drinking clear liquids again.  You may take a stool softener or miralax/milk of magnesia to help with constipation that may occur after anesthesia.  Please make sure a responsible adult is with you for at least 24 hours after surgery and do not drive or make important decisions during this time. Anesthesia may affect your judgment, coordination, and reaction time.    Pain:  If you experience any post-op pain, pain can be managed by taking Ibuprofen and/or Tylenol.    Follow up:  Follow up in our office in 2 weeks to discuss pathology and review next steps in plan of care    When to call your provider:  Vaginal bleeding that is more than a normal period that doesn't taper down.  Bleeding through 1 sanitary pad an hour.  Any clots the size of an egg or bigger.  Fever of 100.4 or higher with chills.  Unusual or foul smelling discharge.  Worsening abdominal pain.    Aureliano Llamas MD

## 2024-03-06 NOTE — ANESTHESIA POSTPROCEDURE EVALUATION
Patient: Suze Cohen    Procedure Summary       Date: 03/06/24 Room / Location: [facility] OR 07 / [facility] OR    Anesthesia Start: 0739 Anesthesia Stop: 0858    Procedure: HYSTEROSCOPY D&C, POLYPECTOMY WITH MYOSURE (Vagina ) Diagnosis:       Endometrial hyperplasia      Endometrial polyp      (Endometrial hyperplasia [N85.00])      (Endometrial polyp [N84.0])    Surgeons: Aureliano Llamas MD Responsible Provider: CHARLIE Brar    Anesthesia Type: general ASA Status: 3            Anesthesia Type: general    Vitals Value Taken Time   /80 03/06/24 0922   Temp 36.8 °C (98.2 °F) 03/06/24 0852   Pulse 78 03/06/24 0929   Resp 20 03/06/24 0922   SpO2 91 % 03/06/24 0929   Vitals shown include unvalidated device data.    Anesthesia Post Evaluation    Patient location during evaluation: PACU  Patient participation: complete - patient participated  Level of consciousness: awake and alert  Pain management: satisfactory to patient  Airway patency: patent  Cardiovascular status: acceptable and blood pressure returned to baseline  Respiratory status: acceptable  Hydration status: acceptable  Postoperative Nausea and Vomiting: mild (Droperidol)        No notable events documented.

## 2024-03-06 NOTE — ANESTHESIA PROCEDURE NOTES
Airway  Date/Time: 3/6/2024 7:49 AM  Urgency: elective    Airway not difficult    Staffing  Performed: CRNA   Authorized by: CHARLIE Brar    Performed by: CHARLIE Brar  Patient location during procedure: OR    Indications and Patient Condition  Indications for airway management: anesthesia  Sedation level: deep  Preoxygenated: yes  Patient position: sniffing  Mask difficulty assessment: 1 - vent by mask  Planned trial extubation    Final Airway Details  Final airway type: supraglottic airway      Successful airway: Supraglottic airway: I-GEL.  Size 5     Number of attempts at approach: 1

## 2024-03-12 DIAGNOSIS — N85.00 ENDOMETRIAL HYPERPLASIA: Primary | ICD-10-CM

## 2024-03-13 ENCOUNTER — TELEPHONE (OUTPATIENT)
Dept: OBSTETRICS AND GYNECOLOGY | Facility: HOSPITAL | Age: 34
End: 2024-03-13
Payer: COMMERCIAL

## 2024-03-13 NOTE — TELEPHONE ENCOUNTER
D/w Dr. Samayoa this am who recommend Mirena IUD with repeat EMB in 6 month.  TC to patient. Notified of plan  GYN Onc visit canceled.

## 2024-04-01 ENCOUNTER — PROCEDURE VISIT (OUTPATIENT)
Dept: OBSTETRICS AND GYNECOLOGY | Facility: CLINIC | Age: 34
End: 2024-04-01
Payer: COMMERCIAL

## 2024-04-01 VITALS — DIASTOLIC BLOOD PRESSURE: 80 MMHG | BODY MASS INDEX: 51.94 KG/M2 | WEIGHT: 293 LBS | SYSTOLIC BLOOD PRESSURE: 128 MMHG

## 2024-04-01 DIAGNOSIS — N85.00 ENDOMETRIAL HYPERPLASIA: Primary | ICD-10-CM

## 2024-04-01 DIAGNOSIS — Z30.430 ENCOUNTER FOR IUD INSERTION: ICD-10-CM

## 2024-04-01 DIAGNOSIS — N93.9 ABNORMAL UTERINE BLEEDING (AUB): ICD-10-CM

## 2024-04-01 LAB — PREGNANCY TEST URINE, POC: NEGATIVE

## 2024-04-01 PROCEDURE — 99213 OFFICE O/P EST LOW 20 MIN: CPT | Performed by: OBSTETRICS & GYNECOLOGY

## 2024-04-01 PROCEDURE — 81025 URINE PREGNANCY TEST: CPT | Performed by: OBSTETRICS & GYNECOLOGY

## 2024-04-01 PROCEDURE — 58300 INSERT INTRAUTERINE DEVICE: CPT | Performed by: OBSTETRICS & GYNECOLOGY

## 2024-04-01 NOTE — PROGRESS NOTES
HPI  Suze Cohen is a 33 y.o.  with a history of AUB and significant h/o atypical endometrial hyperplasia who is presenting today for IUD insertion.    No unprotected sex for > 2 wks prior to procedure.     S/p hysteroscopy d&c, polypectomy with myosure on 03/06/2024.  Pathology: endometrial hyperplasia without atypia, fragments of endometrial polyp    D/w GYN oncology, Dr. Samayoa who recommended Mirena IUD and repeat EMB in 6 months.     Hx:01/12/2024:   Endometrium, biopsy:12/18/2023  -- Endometrial hyperplasia without atypia  -- Features suggestive of endometrial polyp        AUB Hx: 11/10/2023  AUB:  Bleeding on and off since 8/2023. She bleed every day for a month. A few nights she woke up and bled to the sheets. Sometimes she has to wear a pad and other days she did not need to wear anything. She did not have a period prior to August. She hopes to become pregnant. Last sexual intercourse was today. She was to follow-up in office for continued management if she did not become pregnant.   BMI 51     Known PCOS and atypical endometrial hyperplasia. Previously had IUD for management of endometrial hyperplasia. She had it removed 7/2021 as she wished to become pregnant.      Last EMB 5/2021 inactive endometrium.   Pt with atypical hyperplasia on EMB and subsequent D&C pathology was the same.   s/p D&C 6/19/19 for further characterization and pathology was the same:  s/p GYN oncology visit with Dr. Hernandez with recs for Mirena IUD (placed 7/2019) and serial EMB's q6 months unless  she becomes symptomatic with concerning bleeding and will be for EMB at that time  Mirena IUD placed 7/1/19.   Per Dr. Hernandez's recommendations, she is for EMB at 4 months and if pathology is reassuring, she will be   serial EMBs negative: 11/2019, 4/2020, 10/2020, 5/2021     OB hx: Nulligravida  GYN hx:   - menarche, menstrual pattern, LMP: AUB  - Sexual activity/issues, STI protection/history, birth control: Sexually active  without issues. No concern for STIs  - vaginal/vulvar lesions, irritation, or abnormal discharge: no  - HPV vaccine: complete  FH: No GYN related cancers including breast, ovarian, endometrial, cervical, or colon cancer.     Review of Systems   Genitourinary:  Positive for non-menstrual bleeding.       VITAL SIGNS  /80   Wt (!) 164 kg (362 lb)   LMP 01/01/2024   BMI 51.94 kg/m²     IUD Insertion    Date/Time: 4/1/2024 10:04 AM    Performed by: Aureliano Llamas MD  Authorized by: Aureliano Llamas MD    Consent:     Consent obtained:  Verbal    Consent given by:  Patient    Procedure risks and benefits discussed: yes      Patient questions answered: yes      Patient agrees, verbalizes understanding, and wants to proceed: yes      Educational handouts given: yes      Instructions and paperwork completed: yes    Procedure:     Pelvic exam performed: yes      Negative GC/chlamydia test: yes      Negative urine pregnancy test: yes      Negative serum pregnancy test: yes      Cervix cleaned and prepped: yes      Speculum placed in vagina: yes      Tenaculum applied to cervix: yes      Uterus sound depth (cm):  8    IUD inserted with no complications: yes      IUD type:  Mirena    Strings trimmed: yes    Post-procedure:     Patient tolerated procedure well: yes      Patient will follow up after next period: yes          Assessment/Plan   AUB, endometrial hyperplasia without atypia, h/o endometrial hyperplasia with atypia  S/p EMB and D&C, currently without atypia  Per Dr. Samayoa, plan for Mirena IUD insertion and repeat EMB in 6 months.  Discussed plan with pt, agreeable    HCG negative  IUD inserted, pt tolerated well.   RTC 2 months for IUD check  RTC in 6 months for EMB    Scribe Attestation  By signing my name below, Anyi HEALY, [place]   attest that this documentation has been prepared under the direction and in the presence of Aureliano Llamas MD.    Scribe Attestation  By signing my name below, Bety HEALY  Rio Montoay   attest that this documentation has been prepared under the direction and in the presence of Aureliano Llamas MD.

## 2024-04-16 ENCOUNTER — APPOINTMENT (OUTPATIENT)
Dept: GYNECOLOGIC ONCOLOGY | Facility: CLINIC | Age: 34
End: 2024-04-16
Payer: COMMERCIAL

## 2024-06-10 ENCOUNTER — OFFICE VISIT (OUTPATIENT)
Dept: OBSTETRICS AND GYNECOLOGY | Facility: CLINIC | Age: 34
End: 2024-06-10
Payer: COMMERCIAL

## 2024-06-10 VITALS
WEIGHT: 293 LBS | HEIGHT: 70 IN | SYSTOLIC BLOOD PRESSURE: 124 MMHG | DIASTOLIC BLOOD PRESSURE: 90 MMHG | BODY MASS INDEX: 41.95 KG/M2

## 2024-06-10 DIAGNOSIS — Z30.431 IUD CHECK UP: Primary | ICD-10-CM

## 2024-06-10 PROCEDURE — 99213 OFFICE O/P EST LOW 20 MIN: CPT | Performed by: OBSTETRICS & GYNECOLOGY

## 2024-06-10 RX ORDER — LEVONORGESTREL 52 MG/1
1 INTRAUTERINE DEVICE INTRAUTERINE ONCE
COMMUNITY

## 2024-06-10 NOTE — PROGRESS NOTES
HPI  Suze Cohen is a 33 y.o.  with a history of AUB and significant h/o atypical endometrial hyperplasia who is presenting today for recheck IUD 2 months post insertion.    Mirena IUD inserted 04/01/2024 in setting of endometrial hyperplasia without atypia and endometrial polyp.    She notes some mild pelvic cramping and spotting x 1 month following insertion but has since resolved.    Hx: 04/01/2024:   No unprotected sex for > 2 wks prior to procedure.     S/p hysteroscopy d&c, polypectomy with myosure on 03/06/2024.  Pathology: endometrial hyperplasia without atypia, fragments of endometrial polyp    D/w GYN oncology, Dr. Samayoa who recommended Mirena IUD and repeat EMB in 6 months.     Hx: 01/12/2024:   Endometrium, biopsy:12/18/2023  -- Endometrial hyperplasia without atypia  -- Features suggestive of endometrial polyp    AUB Hx: 11/10/2023  AUB:  Bleeding on and off since 8/2023. She bleed every day for a month. A few nights she woke up and bled to the sheets. Sometimes she has to wear a pad and other days she did not need to wear anything. She did not have a period prior to August. She hopes to become pregnant. Last sexual intercourse was today. She was to follow-up in office for continued management if she did not become pregnant.   BMI 51     Known PCOS and atypical endometrial hyperplasia. Previously had IUD for management of endometrial hyperplasia. She had it removed 7/2021 as she wished to become pregnant.      Last EMB 5/2021 inactive endometrium.   Pt with atypical hyperplasia on EMB and subsequent D&C pathology was the same.   s/p D&C 6/19/19 for further characterization and pathology was the same:  s/p GYN oncology visit with Dr. Hernandez with recs for Mirena IUD (placed 7/2019) and serial EMB's q6 months unless  she becomes symptomatic with concerning bleeding and will be for EMB at that time  Mirena IUD placed 7/1/19.   Per Dr. Hernandez's recommendations, she is for EMB at 4 months and  "if pathology is reassuring, she will be   serial EMBs negative: 11/2019, 4/2020, 10/2020, 5/2021     OB hx: Nulligravida  GYN hx:   - menarche, menstrual pattern, LMP: AUB  - Sexual activity/issues, STI protection/history, birth control: Sexually active without issues. No concern for STIs  - vaginal/vulvar lesions, irritation, or abnormal discharge: no  - HPV vaccine: complete  FH: No GYN related cancers including breast, ovarian, endometrial, cervical, or colon cancer.     ROS  ROS is notable for pelvic cramping and spotting x 1 month, now resolved.  10 point ROS negative except as listed above.     VITAL SIGNS  /90   Ht 1.778 m (5' 10\")   Wt (!) 170 kg (375 lb)   BMI 53.81 kg/m²     TVUS: IUD visualized in correct position. Endometrial strip 7.7 cm.  Imaging reviewed and interpreted by me, discussed with patient.    Physical Exam  Constitutional:       Appearance: Normal appearance.   HENT:      Head: Normocephalic and atraumatic.   Eyes:      Extraocular Movements: Extraocular movements intact.      Conjunctiva/sclera: Conjunctivae normal.      Pupils: Pupils are equal, round, and reactive to light.   Pulmonary:      Effort: Pulmonary effort is normal.   Genitourinary:     General: Normal vulva.      Vagina: Normal.      Cervix: Normal.      Uterus: Normal.       Adnexa: Right adnexa normal and left adnexa normal.      Comments: IUD strings visualized  Skin:     General: Skin is dry.   Neurological:      General: No focal deficit present.      Mental Status: She is alert.   Psychiatric:         Mood and Affect: Mood normal.         Behavior: Behavior normal.         Thought Content: Thought content normal.         Judgment: Judgment normal.         Assessment/Plan     Patient presents for IUD check.  IUD in correct position by exam and TVUS    Will plan for repeat EMB in 6 months from prior EMB (completed 3/2024, repeat due 9/2024). Patient aware.    Scribe Attestation  By signing my name below, I, " Rio Haney   attest that this documentation has been prepared under the direction and in the presence of Aureliano Llamas MD.

## 2024-08-15 ENCOUNTER — APPOINTMENT (OUTPATIENT)
Dept: PRIMARY CARE | Facility: CLINIC | Age: 34
End: 2024-08-15
Payer: COMMERCIAL

## 2024-09-09 ENCOUNTER — APPOINTMENT (OUTPATIENT)
Dept: OBSTETRICS AND GYNECOLOGY | Facility: CLINIC | Age: 34
End: 2024-09-09
Payer: COMMERCIAL

## 2024-09-09 VITALS
SYSTOLIC BLOOD PRESSURE: 120 MMHG | HEIGHT: 70 IN | DIASTOLIC BLOOD PRESSURE: 64 MMHG | BODY MASS INDEX: 41.95 KG/M2 | WEIGHT: 293 LBS

## 2024-09-09 DIAGNOSIS — Z32.02 PREGNANCY TEST NEGATIVE: ICD-10-CM

## 2024-09-09 DIAGNOSIS — N85.02 ENDOMETRIAL HYPERPLASIA WITH ATYPIA: Primary | ICD-10-CM

## 2024-09-09 DIAGNOSIS — N93.9 ABNORMAL UTERINE BLEEDING (AUB): ICD-10-CM

## 2024-09-09 LAB — PREGNANCY TEST URINE, POC: NEGATIVE

## 2024-09-09 PROCEDURE — 58100 BIOPSY OF UTERUS LINING: CPT | Performed by: OBSTETRICS & GYNECOLOGY

## 2024-09-09 PROCEDURE — 81025 URINE PREGNANCY TEST: CPT | Performed by: OBSTETRICS & GYNECOLOGY

## 2024-09-09 NOTE — PROGRESS NOTES
HPI  Suze Cohen is a 33 y.o.  with a history of AUB and significant h/o atypical endometrial hyperplasia who is presenting today for EMB  No acute concerns.   Amenorrheic on Mirena IUD.  Occasional crampy pelvic pain  Desires fertility. Has a partner. Would like to lose weight prior to attempting pregnancy in the next few years to optimize health of the pregnancy.    Hx: 04/01/2024:   Mirena IUD inserted for contraception and h/o atypical endometrial hyperplasia    Hx 3/2024: S/p hysteroscopy d&c, polypectomy with myosure   Pathology: endometrial hyperplasia without atypia, fragments of endometrial polyp  D/w GYN oncology, Dr. Samayoa who recommended Mirena IUD and repeat EMB in 6 months.     Hx: 01/12/2024:   Endometrium, biopsy:12/18/2023  -- Endometrial hyperplasia without atypia  -- Features suggestive of endometrial polyp    AUB Hx: 11/10/2023  AUB:  Bleeding on and off since 8/2023. She bleed every day for a month. A few nights she woke up and bled to the sheets. Sometimes she has to wear a pad and other days she did not need to wear anything. She did not have a period prior to August. She hopes to become pregnant. Last sexual intercourse was today. She was to follow-up in office for continued management if she did not become pregnant.   BMI 51     Known PCOS and atypical endometrial hyperplasia. Previously had IUD for management of endometrial hyperplasia. She had it removed 7/2021 as she wished to become pregnant.      Last EMB 5/2021 inactive endometrium.   Pt with atypical hyperplasia on EMB and subsequent D&C pathology was the same.   s/p D&C 6/19/19 for further characterization and pathology was the same:  s/p GYN oncology visit with Dr. Hernandez with recs for Mirena IUD (placed 7/2019) and serial EMB's q6 months unless  she becomes symptomatic with concerning bleeding and will be for EMB at that time  Mirena IUD placed 7/1/19.   Per Dr. Hernandez's recommendations, she is for EMB at 4 months  "and if pathology is reassuring, she will be   serial EMBs negative: 11/2019, 4/2020, 10/2020, 5/2021     OB hx: Nulligravida  GYN hx:   - menarche, menstrual pattern, LMP: AUB  - Sexual activity/issues, STI protection/history, birth control: Sexually active without issues. No concern for STIs  - vaginal/vulvar lesions, irritation, or abnormal discharge: no  - HPV vaccine: complete  FH: No GYN related cancers including breast, ovarian, endometrial, cervical, or colon cancer.     ROS is notable for none except occasional pelvic cramping  10 point ROS negative except as listed above.     VITAL SIGNS  /64   Ht 1.778 m (5' 10\")   Wt (!) 173 kg (382 lb)   BMI 54.81 kg/m²      Endometrial biopsy    Date/Time: 9/9/2024 11:29 AM    Performed by: Aureliano Llamas MD  Authorized by: Aureliano Llamas MD    Consent:     Consent obtained: verbal and written    Consent given by: patient    Patient agrees, verbalizes understanding, and wants to proceed: yes      Procedure explained and questions answered to patient or proxy's satisfaction: yes    Pre-procedure:     Urine pregnancy test: negative    Procedure:     A bimanual exam was performed: yes      Prepped with: Betadine    Tenaculum used: yes      A local block was performed: yes      Block method: intracervical      Local anesthetic: lidocaine 1% w/o epi    Amount used (mL): 1    Number of passes: 3  Findings:     Cervix: normal      Specimen collected: specimen collected and sent to pathology      Patient tolerance: tolerated well, no immediate complications       Assessment/Plan     EMB completed  Pt tolerated well  Management plan will be based on path results   Will let us know when she is ready to pursue pregnancy based on weight loss.     Scribe Attestation  By signing my name below, IBety, Rio   attest that this documentation has been prepared under the direction and in the presence of Aureliano Llamas MD.    "

## 2024-09-23 ENCOUNTER — APPOINTMENT (OUTPATIENT)
Dept: OBSTETRICS AND GYNECOLOGY | Facility: CLINIC | Age: 34
End: 2024-09-23
Payer: COMMERCIAL

## 2024-09-23 VITALS
WEIGHT: 293 LBS | DIASTOLIC BLOOD PRESSURE: 78 MMHG | SYSTOLIC BLOOD PRESSURE: 126 MMHG | BODY MASS INDEX: 41.95 KG/M2 | HEIGHT: 70 IN

## 2024-09-23 DIAGNOSIS — N85.02 EIN (ENDOMETRIAL INTRAEPITHELIAL NEOPLASIA): Primary | ICD-10-CM

## 2024-09-23 PROCEDURE — 1036F TOBACCO NON-USER: CPT | Performed by: OBSTETRICS & GYNECOLOGY

## 2024-09-23 PROCEDURE — 99214 OFFICE O/P EST MOD 30 MIN: CPT | Performed by: OBSTETRICS & GYNECOLOGY

## 2024-09-23 PROCEDURE — 3008F BODY MASS INDEX DOCD: CPT | Performed by: OBSTETRICS & GYNECOLOGY

## 2024-09-23 NOTE — PROGRESS NOTES
HPI  Suze Cohen is a 33 y.o.  with a history of AUB and significant h/o atypical endometrial hyperplasia who is presenting today for a follow up on recent abnormal EMB.    EMB Pathology results 09/09/2024:  Atypical endometrial hyperplasia in a background of inactive endometrium     Hx: 09/09/2024:  EMB performed in office by Dr. Llamas.   Amenorrheic on Mirena IUD, inserted 4/2024  Occasional crampy pelvic pain  Desires fertility. Has a partner. Would like to lose weight prior to attempting pregnancy in the next few years to optimize health of the pregnancy.    Hx: 04/01/2024:   Mirena IUD inserted for contraception and h/o atypical endometrial hyperplasia    Hx 3/2024: S/p hysteroscopy d&c, polypectomy with myosure   Pathology: endometrial hyperplasia without atypia, fragments of endometrial polyp  D/w GYN oncology, Dr. Samayoa who recommended Mirena IUD and repeat EMB in 6 months.     Hx: 01/12/2024:   Endometrium, biopsy:12/18/2023  -- Endometrial hyperplasia without atypia  -- Features suggestive of endometrial polyp    AUB Hx: 11/10/2023  AUB:  Bleeding on and off since 8/2023. She bleed every day for a month. A few nights she woke up and bled to the sheets. Sometimes she has to wear a pad and other days she did not need to wear anything. She did not have a period prior to August. She hopes to become pregnant. Last sexual intercourse was today. She was to follow-up in office for continued management if she did not become pregnant.   BMI 51     Known PCOS and atypical endometrial hyperplasia. Previously had IUD for management of endometrial hyperplasia. She had it removed 7/2021 as she wished to become pregnant.      Last EMB 5/2021 inactive endometrium.   Pt with atypical hyperplasia on EMB and subsequent D&C pathology was the same.   s/p D&C 6/19/19 for further characterization and pathology was the same:  s/p GYN oncology visit with Dr. Hernandez with recs for Mirena IUD (placed 7/2019) and serial  "EMB's q6 months unless  she becomes symptomatic with concerning bleeding and will be for EMB at that time  Mirena IUD placed 7/1/19.   Per Dr. Hernandez's recommendations, she is for EMB at 4 months and if pathology is reassuring, she will be   serial EMBs negative: 11/2019, 4/2020, 10/2020, 5/2021     OB hx: Nulligravida  GYN hx:   - menarche, menstrual pattern, LMP: AUB  - Sexual activity/issues, STI protection/history, birth control: Sexually active without issues. No concern for STIs  - vaginal/vulvar lesions, irritation, or abnormal discharge: no  - HPV vaccine: complete  FH: No GYN related cancers including breast, ovarian, endometrial, cervical, or colon cancer.     ROS is notable for none except occasional pelvic cramping  10 point ROS negative except as listed above.     VITAL SIGNS  /78   Ht 1.778 m (5' 10\")   Wt (!) 176 kg (388 lb 9.6 oz)   BMI 55.76 kg/m²      Physical Exam  Constitutional:       Appearance: Normal appearance.   HENT:      Head: Normocephalic and atraumatic.   Eyes:      Extraocular Movements: Extraocular movements intact.      Conjunctiva/sclera: Conjunctivae normal.      Pupils: Pupils are equal, round, and reactive to light.   Pulmonary:      Effort: Pulmonary effort is normal.   Skin:     General: Skin is dry.   Neurological:      General: No focal deficit present.      Mental Status: She is alert.   Psychiatric:         Mood and Affect: Mood normal.         Behavior: Behavior normal.         Thought Content: Thought content normal.         Judgment: Judgment normal.          Assessment/Plan   Recurrent atypical endometrial hyperplasia (EIN)  Currently on Mirena IUD with previous endometrial pathology negative for atypia.  Reviewed pathology to date. Reviewed risk of progression to EMCA.   Referred to GYN oncologist Dr. Samayoa. Yuliana notified.  Recommend hysteroscopy d&c. Consent signed. Surgery scheduler Claudia notified.  She will need pathology review and management " plan by GYN Onc, Dr. Samayoa with plans to f/up two weeks postop with Dr. Samayoa.     Scribe Attestation  By signing my name below, I, Rio Monge   attest that this documentation has been prepared under the direction and in the presence of Aureliano Llamas MD.

## 2024-10-04 ENCOUNTER — PREP FOR PROCEDURE (OUTPATIENT)
Dept: OBSTETRICS AND GYNECOLOGY | Facility: CLINIC | Age: 34
End: 2024-10-04
Payer: COMMERCIAL

## 2024-10-04 DIAGNOSIS — N85.02 ABNORMAL UTERINE BLEEDING DUE TO ATYPICAL ENDOMETRIAL HYPERPLASIA: Primary | ICD-10-CM

## 2024-10-04 DIAGNOSIS — N93.9 ABNORMAL UTERINE BLEEDING DUE TO ATYPICAL ENDOMETRIAL HYPERPLASIA: Primary | ICD-10-CM

## 2024-10-04 RX ORDER — SODIUM CHLORIDE, SODIUM LACTATE, POTASSIUM CHLORIDE, CALCIUM CHLORIDE 600; 310; 30; 20 MG/100ML; MG/100ML; MG/100ML; MG/100ML
20 INJECTION, SOLUTION INTRAVENOUS CONTINUOUS
OUTPATIENT
Start: 2024-10-04

## 2024-10-04 NOTE — PROGRESS NOTES
HPI  Suze Cohen is a 33 y.o.  with a history of AUB and significant h/o atypical endometrial hyperplasia who is presenting today for hysteroscopy, D&C  No acute concerns.  Mirena IUD in place.     EMB Pathology results 09/09/2024:  Atypical endometrial hyperplasia in a background of inactive endometrium     Hx: 09/09/2024:  EMB performed in office by Dr. Llamas.   Amenorrheic on Mirena IUD, inserted 4/2024  Occasional crampy pelvic pain  Desires fertility. Has a partner. Would like to lose weight prior to attempting pregnancy in the next few years to optimize health of the pregnancy.    Hx: 04/01/2024:   Mirena IUD inserted for contraception and h/o atypical endometrial hyperplasia    Hx 3/2024: S/p hysteroscopy d&c, polypectomy with myosure   Pathology: endometrial hyperplasia without atypia, fragments of endometrial polyp  D/w GYN oncology, Dr. Samayoa who recommended Mirena IUD and repeat EMB in 6 months.     Hx: 01/12/2024:   Endometrium, biopsy:12/18/2023  -- Endometrial hyperplasia without atypia  -- Features suggestive of endometrial polyp    AUB Hx: 11/10/2023  AUB:  Bleeding on and off since 8/2023. She bleed every day for a month. A few nights she woke up and bled to the sheets. Sometimes she has to wear a pad and other days she did not need to wear anything. She did not have a period prior to August. She hopes to become pregnant. Last sexual intercourse was today. She was to follow-up in office for continued management if she did not become pregnant.   BMI 51     Known PCOS and atypical endometrial hyperplasia. Previously had IUD for management of endometrial hyperplasia. She had it removed 7/2021 as she wished to become pregnant.      Last EMB 5/2021 inactive endometrium.   Pt with atypical hyperplasia on EMB and subsequent D&C pathology was the same.   s/p D&C 6/19/19 for further characterization and pathology was the same:  s/p GYN oncology visit with Dr. Hernandez with recs for Mirena IUD  (placed 7/2019) and serial EMB's q6 months unless  she becomes symptomatic with concerning bleeding and will be for EMB at that time  Mirena IUD placed 7/1/19.   Per Dr. Hernandez's recommendations, she is for EMB at 4 months and if pathology is reassuring, she will be   serial EMBs negative: 11/2019, 4/2020, 10/2020, 5/2021     OB hx: Nulligravida  GYN hx:   - menarche, menstrual pattern, LMP: AUB  - Sexual activity/issues, STI protection/history, birth control: Sexually active without issues. No concern for STIs  - vaginal/vulvar lesions, irritation, or abnormal discharge: no  - HPV vaccine: complete  FH: No GYN related cancers including breast, ovarian, endometrial, cervical, or colon cancer.     ROS is notable for none except occasional pelvic cramping  10 point ROS negative except as listed above.     VITAL SIGNS  There were no vitals taken for this visit.     Physical Exam  Constitutional:       Appearance: Normal appearance.   HENT:      Head: Normocephalic and atraumatic.   Eyes:      Extraocular Movements: Extraocular movements intact.      Conjunctiva/sclera: Conjunctivae normal.      Pupils: Pupils are equal, round, and reactive to light.   Pulmonary:      Effort: Pulmonary effort is normal.   Skin:     General: Skin is dry.   Neurological:      General: No focal deficit present.      Mental Status: She is alert.   Psychiatric:         Mood and Affect: Mood normal.         Behavior: Behavior normal.         Thought Content: Thought content normal.         Judgment: Judgment normal.          Assessment/Plan   Recurrent atypical endometrial hyperplasia (EIN)  Currently on Mirena IUD with previous endometrial pathology negative for atypia.  Proceed to OR for hysteroscopy d&c. Consent signed.   HCG preop     Scribe Attestation  By signing my name below, I, Tere Acuna Scrlarry   attest that this documentation has been prepared under the direction and in the presence of Aureliano Llamas MD.

## 2024-11-05 ENCOUNTER — PRE-ADMISSION TESTING (OUTPATIENT)
Dept: PREADMISSION TESTING | Facility: HOSPITAL | Age: 34
End: 2024-11-05
Payer: COMMERCIAL

## 2024-11-05 VITALS
HEART RATE: 88 BPM | TEMPERATURE: 97.2 F | RESPIRATION RATE: 16 BRPM | DIASTOLIC BLOOD PRESSURE: 71 MMHG | SYSTOLIC BLOOD PRESSURE: 105 MMHG | OXYGEN SATURATION: 97 %

## 2024-11-05 DIAGNOSIS — N93.9 ABNORMAL UTERINE BLEEDING DUE TO ATYPICAL ENDOMETRIAL HYPERPLASIA: ICD-10-CM

## 2024-11-05 DIAGNOSIS — Z01.818 PREOPERATIVE EXAMINATION: Primary | ICD-10-CM

## 2024-11-05 DIAGNOSIS — N85.02 ABNORMAL UTERINE BLEEDING DUE TO ATYPICAL ENDOMETRIAL HYPERPLASIA: ICD-10-CM

## 2024-11-05 LAB
ANION GAP SERPL CALC-SCNC: 15 MMOL/L (ref 10–20)
BASOPHILS # BLD AUTO: 0.08 X10*3/UL (ref 0–0.1)
BASOPHILS NFR BLD AUTO: 0.8 %
BUN SERPL-MCNC: 11 MG/DL (ref 6–23)
CALCIUM SERPL-MCNC: 9.4 MG/DL (ref 8.6–10.3)
CHLORIDE SERPL-SCNC: 101 MMOL/L (ref 98–107)
CO2 SERPL-SCNC: 28 MMOL/L (ref 21–32)
CREAT SERPL-MCNC: 0.69 MG/DL (ref 0.5–1.05)
EGFRCR SERPLBLD CKD-EPI 2021: >90 ML/MIN/1.73M*2
EOSINOPHIL # BLD AUTO: 0.75 X10*3/UL (ref 0–0.7)
EOSINOPHIL NFR BLD AUTO: 7.4 %
ERYTHROCYTE [DISTWIDTH] IN BLOOD BY AUTOMATED COUNT: 12.4 % (ref 11.5–14.5)
GLUCOSE SERPL-MCNC: 120 MG/DL (ref 74–99)
HCT VFR BLD AUTO: 40.2 % (ref 36–46)
HGB BLD-MCNC: 13.3 G/DL (ref 12–16)
IMM GRANULOCYTES # BLD AUTO: 0.04 X10*3/UL (ref 0–0.7)
IMM GRANULOCYTES NFR BLD AUTO: 0.4 % (ref 0–0.9)
LYMPHOCYTES # BLD AUTO: 3.77 X10*3/UL (ref 1.2–4.8)
LYMPHOCYTES NFR BLD AUTO: 37.2 %
MCH RBC QN AUTO: 28.9 PG (ref 26–34)
MCHC RBC AUTO-ENTMCNC: 33.1 G/DL (ref 32–36)
MCV RBC AUTO: 87 FL (ref 80–100)
MONOCYTES # BLD AUTO: 0.54 X10*3/UL (ref 0.1–1)
MONOCYTES NFR BLD AUTO: 5.3 %
NEUTROPHILS # BLD AUTO: 4.96 X10*3/UL (ref 1.2–7.7)
NEUTROPHILS NFR BLD AUTO: 48.9 %
NRBC BLD-RTO: 0 /100 WBCS (ref 0–0)
PLATELET # BLD AUTO: 300 X10*3/UL (ref 150–450)
POTASSIUM SERPL-SCNC: 4.2 MMOL/L (ref 3.5–5.3)
RBC # BLD AUTO: 4.6 X10*6/UL (ref 4–5.2)
SODIUM SERPL-SCNC: 140 MMOL/L (ref 136–145)
WBC # BLD AUTO: 10.1 X10*3/UL (ref 4.4–11.3)

## 2024-11-05 PROCEDURE — 36415 COLL VENOUS BLD VENIPUNCTURE: CPT

## 2024-11-05 PROCEDURE — 99204 OFFICE O/P NEW MOD 45 MIN: CPT | Performed by: NURSE PRACTITIONER

## 2024-11-05 PROCEDURE — 85025 COMPLETE CBC W/AUTO DIFF WBC: CPT

## 2024-11-05 PROCEDURE — 80048 BASIC METABOLIC PNL TOTAL CA: CPT

## 2024-11-05 ASSESSMENT — DUKE ACTIVITY SCORE INDEX (DASI)
CAN YOU DO LIGHT WORK AROUND THE HOUSE LIKE DUSTING OR WASHING DISHES: YES
CAN YOU WALK A BLOCK OR TWO ON LEVEL GROUND: YES
DASI METS SCORE: 8.2
CAN YOU RUN A SHORT DISTANCE: YES
TOTAL_SCORE: 44.7
CAN YOU PARTICIPATE IN STRENOUS SPORTS LIKE SWIMMING, SINGLES TENNIS, FOOTBALL, BASKETBALL, OR SKIING: NO
CAN YOU DO MODERATE WORK AROUND THE HOUSE LIKE VACUUMING, SWEEPING FLOORS OR CARRYING GROCERIES: YES
CAN YOU HAVE SEXUAL RELATIONS: YES
CAN YOU WALK INDOORS, SUCH AS AROUND YOUR HOUSE: YES
CAN YOU DO HEAVY WORK AROUND THE HOUSE LIKE SCRUBBING FLOORS OR LIFTING AND MOVING HEAVY FURNITURE: YES
CAN YOU CLIMB A FLIGHT OF STAIRS OR WALK UP A HILL: YES
CAN YOU PARTICIPATE IN MODERATE RECREATIONAL ACTIVITIES LIKE GOLF, BOWLING, DANCING, DOUBLES TENNIS OR THROWING A BASEBALL OR FOOTBALL: NO
CAN YOU TAKE CARE OF YOURSELF (EAT, DRESS, BATHE, OR USE TOILET): YES
CAN YOU DO YARD WORK LIKE RAKING LEAVES, WEEDING OR PUSHING A MOWER: YES

## 2024-11-05 ASSESSMENT — ENCOUNTER SYMPTOMS
NEUROLOGICAL NEGATIVE: 1
RESPIRATORY NEGATIVE: 1
EYES NEGATIVE: 1
MUSCULOSKELETAL NEGATIVE: 1
CONSTITUTIONAL NEGATIVE: 1
GASTROINTESTINAL NEGATIVE: 1
CARDIOVASCULAR NEGATIVE: 1
NECK NEGATIVE: 1

## 2024-11-05 ASSESSMENT — PAIN SCALES - GENERAL: PAINLEVEL_OUTOF10: 0 - NO PAIN

## 2024-11-05 ASSESSMENT — PAIN - FUNCTIONAL ASSESSMENT: PAIN_FUNCTIONAL_ASSESSMENT: 0-10

## 2024-11-05 ASSESSMENT — ACTIVITIES OF DAILY LIVING (ADL): ADL_SCORE: 0

## 2024-11-05 ASSESSMENT — LIFESTYLE VARIABLES: SMOKING_STATUS: NONSMOKER

## 2024-11-05 NOTE — PREPROCEDURE INSTRUCTIONS
Thank you for visiting [facility] ([facility]) today for your pre-procedure evaluation, you were seen by     Ruthann Aceves CNP  Pre Admission Testing  [facility]  [phone]    This summary includes instructions and information to aid you during your perioperative period.  Please read carefully. If you have any questions about your visit today, please call the number listed above.  If you become ill or have any changes to your health before your surgery, please contact your primary care provider and alert your surgeon.      Preparing for your Surgery       Exercises  Preoperative Deep Breathing Exercises  Why it is important to do deep breathing exercises before my surgery?  Deep breathing exercises strengthen your breathing muscles.  This helps you to recover after your surgery and decreases the chance of breathing complications.  How are the deep breathing exercises done?  Sit straight with your back supported.  Breathe in deeply and slowly through your nose. Your lower rib cage should expand and your abdomen may move forward.  Hold that breath for 3 to 5 seconds.  Breathe out through pursed lips, slowly and completely.  Rest and repeat 10 times every hour while awake.  Rest longer if you become dizzy or lightheaded.       Preoperative Brain Exercises    What are brain exercises?  A brain exercise is any activity that engages your thinking (cognitive) skills.    What types of activities are considered brain exercises?  Jigsaw puzzles, crossword puzzles, word jumble, memory games, word search, and many more.  Many can be found free online or on your phone via a mobile rafia.    Why should I do brain exercises before my surgery?  More recent research has shown brain exercise before surgery can lower the risk of postoperative delirium (confusion) which can be especially important for older adults.  Patients who did brain exercises for 5 to 10 hours the days before surgery, cut their risk  of postoperative delirium in half up to 1 week after surgery.    Sit-to-Stand Exercise    What is the sit-to-stand exercise?  The sit-to-stand exercise strengthens the muscles of your lower body and muscles in the center of your body (core muscles for stability) helping to maintain and improve your strength and mobility.  How do I do the sit-to-stand exercise?  The goal is to do this exercise without using your arms or hands.  If this is too difficult, use your arms and hands or a chair with armrests to help slowly push yourself to the standing position and lower yourself back to the sitting position. As the movement becomes easier use your arms and hands less.    Steps to the sit-to-stand exercise  Sit up tall in a sturdy chair, knees bent, feet flat on the floor shoulder-width apart.  Shift your hips/pelvis forward in the chair to correctly position yourself for the next movement.  Lean forward at your hips.  Stand up straight putting equal weight on both feet.  Check to be sure you are properly aligned with the chair, in a slow controlled movement sit back down.  Repeat this exercise 10-15 times.  If needed you can do it fewer times until your strength improves.  Rest for 1 minute.  Do another 10-15 sit-to-stand exercises.  Try to do this in the morning and evening.        Instructions    Preoperative Fasting Guidelines    Why must I stop eating and drinking near surgery time?  With sedation, food or liquid in your stomach can enter your lungs causing serious complications  Food can increase nausea and vomiting  When do I need to stop eating and drinking before my surgery?      Do not eat any food after midnight the night before your surgery/procedure. You may have up to 13.5 ounces of clear liquid until TWO hours before your instructed arrival time to the hospital.  This includes water, black tea/coffee, (no milk or cream) apple juice, and electrolyte drinks (Gatorade). You may chew gum until TWO hours before  your surgery/procedure        Simple things you can do to help prevent blood clots     Blood clots are blockages that can form in the body's veins. When a blood clot forms in your deep veins, it may be called a deep vein thrombosis, or DVT for short. Blood clots can happen in any part of the body where blood flows, but they are most common in the arms and legs. If a piece of a blood clot breaks free and travels to the lungs, it is called a pulmonary embolus (PE). A PE can be a very serious problem.         Being in the hospital or having surgery can raise your chances of getting a blood clot because you may not be well enough to move around as much as you normally do.         Ways you can help prevent blood clots in the hospital       Wearing SCDs  SCDs stands for Sequential Compression Devices.   SCDs are special sleeves that wrap around your legs. They attach to a pump that fills them with air to gently squeeze your legs every few minutes.  This helps return the blood in your legs to your heart.   SCDs should only be taken off when walking or bathing. SCDs may not be comfortable, but they can help save your life.              Pump SCD leg sleeves  Wearing compression stockings - if your doctor orders them. These special snug-fitting stockings gently squeeze your legs to help blood flow.       Walking. Walking helps move the blood in your legs.   If your doctor says it is ok, try walking the halls at least   5 times a day. Ask us to help you get up, so you don't fall.      Taking any blood-thinning medicines your doctor orders.              Ways you can help prevent blood clots at home         Wearing compression stockings - if your doctor orders them.   Walking - to help move the blood in your legs.    Taking any blood-thinning medicines your doctor orders.      Signs of a blood clot or PE    Tell your doctor or nurse right away if you have any of the problems listed below.         If you are at home, seek medical  care right away. Call 911 for chest pain or problems breathing.            Signs of a blood clot (DVT) - such as pain, swelling, redness, or warmth in your arm or legs.  Signs of a pulmonary embolism (PE) - such as chest pain or feeling short of breath      Tobacco and Alcohol;  Do not drink alcohol or smoke within 24 hours of surgery.  It is best to quit smoking for as long as possible before any surgery or procedure.        The Week before Surgery        Seven days before Surgery  Check your PAT medication instructions  Do the exercises provided to you by PAT  Arrange for a responsible, adult licensed  to take you home after surgery and stay with you for 24 hours.  You will not be permitted to drive yourself home if you have received any anesthetic/sedation  Six days before surgery  Check your PAT medication instructions  Do the exercises provided to you by PAT  Start using Chlorhexidene (CHG) body wash if prescribed  Five days before surgery  Check your PAT medication instructions  Do the exercises provided to you by PAT  Continue to use CHG body wash if prescribed  Three days before surgery  Check your PAT medication instructions  Do the exercises provided to you by PAT  Continue to use CHG body wash if prescribed  Two days before surgery  Check your PAT medication instructions  Do the exercises provided to you by PAT  Continue to use CHG body wash if prescribed    The Day before Surgery       Check your PAT medication and all other PAT instructions including when to stop eating and drinking  You will be called with your arrival time for surgery in the late afternoon.  If you do not receive a call please reach out to [facility]. [phone]  Do not smoke or drink 24 hours before surgery  Prepare items to bring with you to the hospital  Shower with your chlorhexidine wash if prescribed  Brush your teeth and use your chlorhexidine dental rinse if prescribed    The Day of Surgery       Check your PAT  medication instructions  Ensure you follow the instructions for when to stop eating and drinking  Shower, if prescribed use CHG.  Do not apply any lotions, creams, moisturizers, perfume or deodorant  Brush your teeth and use your CHG dental rinse if prescribed  Wear loose comfortable clothing  Avoid make-up  Remove  jewelry and piercings, consider professional piercing removal with a plastic spacer if needed  Bring photo ID and Insurance card  Bring an accurate medication list that includes medication dose, frequency and allergies  Bring a copy of your advanced directives (will, health care power of )  Bring any devices and controllers as well as medical devices you have been provided with for surgery (CPAP, slings, braces, etc.)  Dentures, eyeglasses, and contacts will be removed before surgery, please bring cases for contacts or glasses

## 2024-11-05 NOTE — CPM/PAT H&P
CPM/PAT Evaluation       Name: Suze Cohen (Suze Cohen)  /Age: 1990/33 y.o.     Visit Type:   In-Person       Chief Complaint: Abnormal uterine bleeding    HPI 34 y/o female scheduled for hysteroscopy and D&C on 2024 with  Dr. Llamas secondary to abnormal uterine bleeding.  PMHX includes endometrial hyperplasia, GERD.  PAT is consulted today for perioperative risk stratification and optimization.      Past Medical History:   Diagnosis Date    Class 3 severe obesity with body mass index (BMI) of 50.0 to 59.9 in adult 2024    50.51 kg/m²    Encounter for pregnancy test, result unknown 2020    Encounter for pregnancy test    Endometrial hyperplasia     atypical cells    Endometrial polyp     Female infertility     Other specified anxiety disorders 2016    Depression with anxiety    Personal history of other diseases of the female genital tract     History of polycystic ovarian syndrome    Personal history of other endocrine, nutritional and metabolic disease     History of morbid obesity    Personal history of other infectious and parasitic diseases     History of chickenpox    Polycystic ovary syndrome     PONV (postoperative nausea and vomiting)        Past Surgical History:   Procedure Laterality Date    DILATION AND CURETTAGE OF UTERUS  03/06/2024    x2    HYSTEROSCOPY         Patient  reports being sexually active and has had partner(s) who are male. She reports using the following method of birth control/protection: None.    Family History   Problem Relation Name Age of Onset    Hypertension Mother Trina Betts     Hyperlipidemia Mother Trina Betts     Breast cancer Mother Trina Betts     Hypertension Father James Cohen     Hyperlipidemia Father James Cohen     Diabetes Father James Smithur     Heart disease Father James Smithur     Other (ANXIETY AND DEPRESSION) Sister      Other (LEUKEMIA, IN REMISSION) Maternal Grandmother      Diabetes type II Maternal  Grandfather      Other (CARDIAC DISORDER) Paternal Grandfather      Diabetes type II Paternal Grandfather         Allergies   Allergen Reactions    Extra Eye Drops Other     STEROIDAL EYE DROPS--causes HTN in the eyes.        Prior to Admission medications    Medication Sig Start Date End Date Taking? Authorizing Provider   albuterol 90 mcg/actuation inhaler INHALE 1 TO 2 PUFFS EVERY 4 TO 6 HOURS AS NEEDED FOR WHEEZE FOR UP TO 30 DAYS 8/16/23   Historical Provider, MD   L. acidophilus/Bifid. animalis 32 billion cell capsule Take by mouth.    Historical Provider, MD   latanoprost (Xalatan) 0.005 % ophthalmic solution  5/24/23   Historical Provider, MD   levonorgestrel (Mirena) 21 mcg/24 hr (8 yrs) 52 mg IUD 52 mg by intrauterine route 1 time.    Historical Provider, MD   multivitamin with minerals tablet Take 1 tablet by mouth once daily. HAIR, SKIN AND NAILS FORMULA    Historical Provider, MD        PAT ROS:   Constitutional:   neg    Neuro/Psych:   neg    Eyes:   neg     use of corrective lenses  Ears:   neg    Nose:   Mouth:   Throat:   neg    Neck:   neg    Cardio:   neg    Respiratory:   neg    Endocrine:   GI:   neg    :   neg    Musculoskeletal:   neg    Hematologic:   neg    Skin:      Physical Exam  Constitutional:       Appearance: Normal appearance.   HENT:      Head: Normocephalic and atraumatic.      Mouth/Throat:      Mouth: Mucous membranes are moist.      Pharynx: Oropharynx is clear.   Eyes:      Extraocular Movements: Extraocular movements intact.      Pupils: Pupils are equal, round, and reactive to light.   Cardiovascular:      Rate and Rhythm: Normal rate and regular rhythm.   Pulmonary:      Effort: Pulmonary effort is normal.      Breath sounds: Normal breath sounds.   Abdominal:      General: Abdomen is flat.      Palpations: Abdomen is soft.   Musculoskeletal:         General: Normal range of motion.      Cervical back: Normal range of motion and neck supple.   Skin:     General: Skin is  warm and dry.   Neurological:      General: No focal deficit present.      Mental Status: She is alert and oriented to person, place, and time.   Psychiatric:         Mood and Affect: Mood normal.         Behavior: Behavior normal.          Airway    Visit Vitals  /71   Pulse 88   Temp 36.2 °C (97.2 °F) (Tympanic)   Resp 16       DASI Risk Score      Flowsheet Row Pre-Admission Testing from 11/5/2024 in [facility] Questionnaire Series Submission from 1/31/2024 in [facility] with Generic Provider Alex   Can you take care of yourself (eat, dress, bathe, or use toilet)?  2.75 filed at 11/05/2024 1412 2.75  filed at 01/31/2024 1248   Can you walk indoors, such as around your house? 1.75 filed at 11/05/2024 1412 1.75  filed at 01/31/2024 1248   Can you walk a block or two on level ground?  2.75 filed at 11/05/2024 1412 --   Can you climb a flight of stairs or walk up a hill? 5.5 filed at 11/05/2024 1412 5.5  filed at 01/31/2024 1248   Can you run a short distance? 8 filed at 11/05/2024 1412 8  filed at 01/31/2024 1248   Can you do light work around the house like dusting or washing dishes? 2.7 filed at 11/05/2024 1412 2.7  filed at 01/31/2024 1248   Can you do moderate work around the house like vacuuming, sweeping floors or carrying groceries? 3.5 filed at 11/05/2024 1412 3.5  filed at 01/31/2024 1248   Can you do heavy work around the house like scrubbing floors or lifting and moving heavy furniture?  8 filed at 11/05/2024 1412 8  filed at 01/31/2024 1248   Can you do yard work like raking leaves, weeding or pushing a mower? 4.5 filed at 11/05/2024 1412 4.5  filed at 01/31/2024 1248   Can you have sexual relations? 5.25 filed at 11/05/2024 1412 5.25  filed at 01/31/2024 1248   Can you participate in moderate recreational activities like golf, bowling, dancing, doubles tennis or throwing a baseball or football? 0 filed at 11/05/2024 1412 6  filed at 01/31/2024 1248   Can you participate in  strenous sports like swimming, singles tennis, football, basketball, or skiing? 0 filed at 11/05/2024 1412 7.5  filed at 01/31/2024 1248   DASI SCORE 44.7 filed at 11/05/2024 1412 --   METS Score (Will be calculated only when all the questions are answered) 8.2 filed at 11/05/2024 1412 --          Caprini DVT Assessment      Flowsheet Row Pre-Admission Testing from 11/5/2024 in [facility]   DVT Score 4 filed at 11/05/2024 1417   Surgical Factors Minor surgery planned filed at 11/05/2024 1417   BMI Greater than 50 (Venous stasis syndrome) filed at 11/05/2024 1417          Modified Frailty Index    No data to display       CHADS2 Stroke Risk  Current as of 7 hours ago        N/A 3 to 100%: High Risk   2 to < 3%: Medium Risk   0 to < 2%: Low Risk     Last Change: N/A          This score determines the patient's risk of having a stroke if the patient has atrial fibrillation.        This score is not applicable to this patient. Components are not calculated.          Revised Cardiac Risk Index      Flowsheet Row Pre-Admission Testing from 11/5/2024 in [facility]   High-Risk Surgery (Intraperitoneal, Intrathoracic,Suprainguinal vascular) 0 filed at 11/05/2024 1409   History of ischemic heart disease (History of MI, History of positive exercuse test, Current chest paint considered due to myocardial ischemia, Use of nitrate therapy, ECG with pathological Q Waves) 0 filed at 11/05/2024 1409   History of congestive heart failure (pulmonary edemia, bilateral rales or S3 gallop, Paroxysmal nocturnal dyspnea, CXR showing pulmonary vascular redistribution) 0 filed at 11/05/2024 1409   History of cerebrovascular disease (Prior TIA or stroke) 0 filed at 11/05/2024 1409   Pre-operative insulin treatment 0 filed at 11/05/2024 1409   Pre-operative creatinine>2 mg/dl 0 filed at 11/05/2024 1409   Revised Cardiac Risk Calculator 0 filed at 11/05/2024 1409          Apfel Simplified Score      Flowsheet Row  Pre-Admission Testing from 2024 in [facility]   Smoking status 1 filed at 2024 1409   History of motion sickness or PONV  1 filed at 2024 1409   Use of postoperative opioids 1 filed at 2024 1409   Gender - Female 1=Yes filed at 2024 1409   Apfel Simplified Score Calculator 4 filed at 2024 1409          Risk Analysis Index Results This Encounter         2024  1412             Do you live in a place other than your own home?: 0    When did you begin living in the place you are currently residing?: Greater than one year ago    Any kidney failure, kidney not working well, or seeing a kidney doctor (nephrologist)? If yes, was this for kidney stones or another problem?: 0 No    Any history of chronic (long-term) congestive heart failure (CHF)?: 0 No    Any shortness of breath when resting?: 0 No    In the past five years, have you been diagnosed with or treated for cancer?: No    During the last 3 months has it become difficult for you to remember things or organize your thoughts?: 0 No    Have you lost weight of 10 pounds or more in the past 3 months without trying?: 0 No    Do you have any loss of appetitie?: 0 No    Getting Around (Mobility): 0 Can get around without help    Eatin Can plan and prepare own meals    Toiletin Can use toilet without any help    Personal Hygiene (Bathing, Hand Washing, Changing Clothes): 0 Can shower or bathe without any help    WEBER Cancer History: Patient does not indicate history of cancer    Total Risk Analysis Index Score Without Cancer: 6    Total Risk Analysis Index Score: 6          Stop Bang Score      Flowsheet Row Pre-Admission Testing from 2024 in [facility] Questionnaire Series Submission from 2024 in [facility] with Generic Provider Alex   Do you snore loudly? 0 filed at 2024 1413 0  filed at 2024 1248   Do you often feel tired or fatigued after your sleep? 0 filed at  11/05/2024 1413 0  filed at 01/31/2024 1248   Has anyone ever observed you stop breathing in your sleep? 0 filed at 11/05/2024 1413 0  filed at 01/31/2024 1248   Do you have or are you being treated for high blood pressure? 0 filed at 11/05/2024 1413 0  filed at 01/31/2024 1248   Recent BMI (Calculated) 55.8 filed at 11/05/2024 1413 50.5 filed at 01/31/2024 1248   Is BMI greater than 35 kg/m2? 1=Yes filed at 11/05/2024 1413 1=Yes filed at 01/31/2024 1248   Age older than 50 years old? 0=No filed at 11/05/2024 1413 0=No filed at 01/31/2024 1248   Is your neck circumference greater than 17 inches (Male) or 16 inches (Female)? 0 filed at 11/05/2024 1413 --   Gender - Male 0=No filed at 11/05/2024 1413 0=No filed at 01/31/2024 1248   STOP-BANG Total Score 1 filed at 11/05/2024 1413 --          Prodigy: High Risk  Total Score: 0          ARISCAT Score for Postoperative Pulmonary Complications      Flowsheet Row Pre-Admission Testing from 11/5/2024 in [facility]   Age, years  0 filed at 11/05/2024 1409   Preoperative SpO2 0 filed at 11/05/2024 1409   Respiratory infection in the last month Either upper or lower (i.e., URI, bronchitis, pneumonia), with fever and antibiotic treatment 0 filed at 11/05/2024 1409   Preoperative anemoa (Hgb less than 10 g/dl) 0 filed at 11/05/2024 1409   Surgical incision  0 filed at 11/05/2024 1409   Duration of surgery  0 filed at 11/05/2024 1409   Emergency Procedure  0 filed at 11/05/2024 1409   ARISCAT Total Score  0 filed at 11/05/2024 1409          Marian Perioperative Risk for Myocardial Infarction or Cardiac Arrest (RYANNE)      Flowsheet Row Pre-Admission Testing from 11/5/2024 in [facility]   Age 0.64 filed at 11/05/2024 1410   Functional Status  0 filed at 11/05/2024 1410   ASA Class  -5.17 filed at 11/05/2024 1410   Creatinine 0 filed at 11/05/2024 1410   Type of Procedure  0.76 filed at 11/05/2024 1410   RYANNE Total Score  -9.04 filed at 11/05/2024 1410             Assessment and Plan:     HPI 32 y/o female scheduled for hysteroscopy and D&C on 11/20/2024 with  Dr. Llamas secondary to abnormal uterine bleeding.  PMHX includes endometrial hyperplasia, GERD.  PAT is consulted today for perioperative risk stratification and optimization.      Neuro:  No neurologic diagnosis or significant findings on chart review, clinical presentation and evaluation.  No grossly apparent neurologic perioperative risk.    Patient is not at increased risk for perioperative CVA      HEENT:  No HEENT diagnosis or significant findings on chart review or clinical presentation and evaluation. No further preoperative testing/intervention indicated at this time.    Cardiovascular:  No CV diagnosis or significant findings on chart review or clinical presentation and evaluation. No further preoperative testing or intervention is indicated at this time.  METS: 8.2  RCRI: 0 points, 3.9%  risk for postoperative MACE     Pulmonary:  No pulmonary diagnosis or significant findings on chart review or clinical presentation.  No further preoperative testing is indicated at this time.  Stop Bang score is 1 placing patient at low risk for LACEY  PRODIGY: Low risk for opioid induced respiratory depression      Renal:   No renal diagnosis, however patient is at increase risk for perioperative renal complications secondary to BMI equal to or greater than 30      Endocrine:  No endocrine diagnosis or significant findings on chart review or clinical presentation and evaluation. No further testing or intervention is indicated at this time.    Hematologic:  No hematologic diagnosis, however patient is at an increased risk for DVT  Caprini Score 4, patient at High risk for perioperative DVT.  Patient provided with VTE education/handout.    Gastrointestinal:   No GI diagnosis or significant findings on chart review or clinical presentation and evaluation.   Apfel 4    OB/GYN  Follows with Dr. Llamas.  Last seen 9/23/2024  for endometrial neoplasia  Prior hysteroscopy 3/2024  Plan for hysterscopy and D&C    Infectious disease:   No infectious diagnosis or significant findings on chart review or clinical presentation and evaluation.     Musculoskeletal:   No diagnosis or significant findings on chart review or clinical presentation and evaluation.     Anesthesia/Airway:  PONV    Medication instructions and NPO guidelines reviewed with the patient.  All questions or concerns discussed and addressed.      Labs ordered

## 2024-11-18 ENCOUNTER — ANESTHESIA EVENT (OUTPATIENT)
Dept: OPERATING ROOM | Facility: HOSPITAL | Age: 34
End: 2024-11-18
Payer: COMMERCIAL

## 2024-11-19 RX ORDER — DIPHENHYDRAMINE HYDROCHLORIDE 50 MG/ML
12.5 INJECTION INTRAMUSCULAR; INTRAVENOUS ONCE AS NEEDED
Status: CANCELLED | OUTPATIENT
Start: 2024-11-19

## 2024-11-19 RX ORDER — SODIUM CHLORIDE, SODIUM LACTATE, POTASSIUM CHLORIDE, CALCIUM CHLORIDE 600; 310; 30; 20 MG/100ML; MG/100ML; MG/100ML; MG/100ML
100 INJECTION, SOLUTION INTRAVENOUS CONTINUOUS
Status: CANCELLED | OUTPATIENT
Start: 2024-11-19 | End: 2024-11-20

## 2024-11-19 RX ORDER — OXYCODONE HYDROCHLORIDE 5 MG/1
5 TABLET ORAL EVERY 4 HOURS PRN
Status: CANCELLED | OUTPATIENT
Start: 2024-11-19

## 2024-11-19 RX ORDER — ALBUTEROL SULFATE 0.83 MG/ML
2.5 SOLUTION RESPIRATORY (INHALATION) ONCE AS NEEDED
Status: CANCELLED | OUTPATIENT
Start: 2024-11-19

## 2024-11-19 RX ORDER — DROPERIDOL 2.5 MG/ML
0.62 INJECTION, SOLUTION INTRAMUSCULAR; INTRAVENOUS ONCE AS NEEDED
Status: CANCELLED | OUTPATIENT
Start: 2024-11-19

## 2024-11-19 RX ORDER — ONDANSETRON HYDROCHLORIDE 2 MG/ML
4 INJECTION, SOLUTION INTRAVENOUS ONCE AS NEEDED
Status: CANCELLED | OUTPATIENT
Start: 2024-11-19

## 2024-11-19 RX ORDER — MEPERIDINE HYDROCHLORIDE 25 MG/ML
12.5 INJECTION INTRAMUSCULAR; INTRAVENOUS; SUBCUTANEOUS EVERY 10 MIN PRN
Status: CANCELLED | OUTPATIENT
Start: 2024-11-19

## 2024-11-20 ENCOUNTER — ANESTHESIA (OUTPATIENT)
Dept: OPERATING ROOM | Facility: HOSPITAL | Age: 34
End: 2024-11-20
Payer: COMMERCIAL

## 2024-11-20 ENCOUNTER — HOSPITAL ENCOUNTER (OUTPATIENT)
Facility: HOSPITAL | Age: 34
Setting detail: OUTPATIENT SURGERY
Discharge: HOME | End: 2024-11-20
Attending: OBSTETRICS & GYNECOLOGY | Admitting: OBSTETRICS & GYNECOLOGY
Payer: COMMERCIAL

## 2024-11-20 VITALS
RESPIRATION RATE: 16 BRPM | WEIGHT: 293 LBS | HEART RATE: 80 BPM | HEIGHT: 70 IN | TEMPERATURE: 97.2 F | SYSTOLIC BLOOD PRESSURE: 140 MMHG | BODY MASS INDEX: 41.95 KG/M2 | DIASTOLIC BLOOD PRESSURE: 68 MMHG | OXYGEN SATURATION: 95 %

## 2024-11-20 DIAGNOSIS — N93.9 ABNORMAL UTERINE BLEEDING DUE TO ATYPICAL ENDOMETRIAL HYPERPLASIA: ICD-10-CM

## 2024-11-20 DIAGNOSIS — N85.02 ABNORMAL UTERINE BLEEDING DUE TO ATYPICAL ENDOMETRIAL HYPERPLASIA: ICD-10-CM

## 2024-11-20 LAB — PREGNANCY TEST URINE, POC: NEGATIVE

## 2024-11-20 PROCEDURE — 88305 TISSUE EXAM BY PATHOLOGIST: CPT | Performed by: PATHOLOGY

## 2024-11-20 PROCEDURE — 88305 TISSUE EXAM BY PATHOLOGIST: CPT | Mod: TC,GEALAB | Performed by: OBSTETRICS & GYNECOLOGY

## 2024-11-20 PROCEDURE — 2500000004 HC RX 250 GENERAL PHARMACY W/ HCPCS (ALT 636 FOR OP/ED): Performed by: ANESTHESIOLOGY

## 2024-11-20 PROCEDURE — 2500000004 HC RX 250 GENERAL PHARMACY W/ HCPCS (ALT 636 FOR OP/ED): Performed by: REGISTERED NURSE

## 2024-11-20 PROCEDURE — 3700000002 HC GENERAL ANESTHESIA TIME - EACH INCREMENTAL 1 MINUTE: Performed by: OBSTETRICS & GYNECOLOGY

## 2024-11-20 PROCEDURE — 7100000010 HC PHASE TWO TIME - EACH INCREMENTAL 1 MINUTE: Performed by: OBSTETRICS & GYNECOLOGY

## 2024-11-20 PROCEDURE — 3600000008 HC OR TIME - EACH INCREMENTAL 1 MINUTE - PROCEDURE LEVEL THREE: Performed by: OBSTETRICS & GYNECOLOGY

## 2024-11-20 PROCEDURE — 7100000009 HC PHASE TWO TIME - INITIAL BASE CHARGE: Performed by: OBSTETRICS & GYNECOLOGY

## 2024-11-20 PROCEDURE — 7100000002 HC RECOVERY ROOM TIME - EACH INCREMENTAL 1 MINUTE: Performed by: OBSTETRICS & GYNECOLOGY

## 2024-11-20 PROCEDURE — 58558 HYSTEROSCOPY BIOPSY: CPT | Performed by: OBSTETRICS & GYNECOLOGY

## 2024-11-20 PROCEDURE — 2500000002 HC RX 250 W HCPCS SELF ADMINISTERED DRUGS (ALT 637 FOR MEDICARE OP, ALT 636 FOR OP/ED): Performed by: ANESTHESIOLOGY

## 2024-11-20 PROCEDURE — 3600000003 HC OR TIME - INITIAL BASE CHARGE - PROCEDURE LEVEL THREE: Performed by: OBSTETRICS & GYNECOLOGY

## 2024-11-20 PROCEDURE — 7100000001 HC RECOVERY ROOM TIME - INITIAL BASE CHARGE: Performed by: OBSTETRICS & GYNECOLOGY

## 2024-11-20 PROCEDURE — 2720000007 HC OR 272 NO HCPCS: Performed by: OBSTETRICS & GYNECOLOGY

## 2024-11-20 PROCEDURE — 3700000001 HC GENERAL ANESTHESIA TIME - INITIAL BASE CHARGE: Performed by: OBSTETRICS & GYNECOLOGY

## 2024-11-20 PROCEDURE — 81025 URINE PREGNANCY TEST: CPT | Performed by: OBSTETRICS & GYNECOLOGY

## 2024-11-20 RX ORDER — SCOLOPAMINE TRANSDERMAL SYSTEM 1 MG/1
1 PATCH, EXTENDED RELEASE TRANSDERMAL ONCE
Status: DISCONTINUED | OUTPATIENT
Start: 2024-11-20 | End: 2024-11-20 | Stop reason: HOSPADM

## 2024-11-20 RX ORDER — SODIUM CHLORIDE, SODIUM LACTATE, POTASSIUM CHLORIDE, CALCIUM CHLORIDE 600; 310; 30; 20 MG/100ML; MG/100ML; MG/100ML; MG/100ML
20 INJECTION, SOLUTION INTRAVENOUS CONTINUOUS
Status: DISCONTINUED | OUTPATIENT
Start: 2024-11-20 | End: 2024-11-20 | Stop reason: HOSPADM

## 2024-11-20 RX ORDER — APREPITANT 40 MG/1
40 CAPSULE ORAL DAILY
Status: DISCONTINUED | OUTPATIENT
Start: 2024-11-20 | End: 2024-11-20 | Stop reason: HOSPADM

## 2024-11-20 RX ORDER — ONDANSETRON HYDROCHLORIDE 2 MG/ML
INJECTION, SOLUTION INTRAVENOUS AS NEEDED
Status: DISCONTINUED | OUTPATIENT
Start: 2024-11-20 | End: 2024-11-20

## 2024-11-20 RX ORDER — PROPOFOL 10 MG/ML
INJECTION, EMULSION INTRAVENOUS AS NEEDED
Status: DISCONTINUED | OUTPATIENT
Start: 2024-11-20 | End: 2024-11-20

## 2024-11-20 RX ORDER — FENTANYL CITRATE 50 UG/ML
INJECTION, SOLUTION INTRAMUSCULAR; INTRAVENOUS AS NEEDED
Status: DISCONTINUED | OUTPATIENT
Start: 2024-11-20 | End: 2024-11-20

## 2024-11-20 RX ORDER — LIDOCAINE HCL/PF 100 MG/5ML
SYRINGE (ML) INTRAVENOUS AS NEEDED
Status: DISCONTINUED | OUTPATIENT
Start: 2024-11-20 | End: 2024-11-20

## 2024-11-20 RX ORDER — SUCCINYLCHOLINE CHLORIDE 20 MG/ML
INJECTION INTRAMUSCULAR; INTRAVENOUS AS NEEDED
Status: DISCONTINUED | OUTPATIENT
Start: 2024-11-20 | End: 2024-11-20

## 2024-11-20 RX ORDER — MIDAZOLAM HYDROCHLORIDE 1 MG/ML
INJECTION INTRAMUSCULAR; INTRAVENOUS AS NEEDED
Status: DISCONTINUED | OUTPATIENT
Start: 2024-11-20 | End: 2024-11-20

## 2024-11-20 RX ORDER — KETOROLAC TROMETHAMINE 30 MG/ML
INJECTION, SOLUTION INTRAMUSCULAR; INTRAVENOUS AS NEEDED
Status: DISCONTINUED | OUTPATIENT
Start: 2024-11-20 | End: 2024-11-20

## 2024-11-20 RX ORDER — DEXMEDETOMIDINE IN 0.9 % NACL 20 MCG/5ML
SYRINGE (ML) INTRAVENOUS AS NEEDED
Status: DISCONTINUED | OUTPATIENT
Start: 2024-11-20 | End: 2024-11-20

## 2024-11-20 RX ORDER — HYDROMORPHONE HYDROCHLORIDE 2 MG/ML
INJECTION, SOLUTION INTRAMUSCULAR; INTRAVENOUS; SUBCUTANEOUS AS NEEDED
Status: DISCONTINUED | OUTPATIENT
Start: 2024-11-20 | End: 2024-11-20

## 2024-11-20 SDOH — HEALTH STABILITY: MENTAL HEALTH: CURRENT SMOKER: 0

## 2024-11-20 ASSESSMENT — PAIN - FUNCTIONAL ASSESSMENT
PAIN_FUNCTIONAL_ASSESSMENT: 0-10
PAIN_FUNCTIONAL_ASSESSMENT: 0-10

## 2024-11-20 ASSESSMENT — PAIN SCALES - GENERAL
PAINLEVEL_OUTOF10: 0 - NO PAIN

## 2024-11-20 NOTE — OP NOTE
HYSTEROSCOPY, D&C WITH MYOSURE Operative Note     Date: 2024  OR Location: A OR    Name: Suze Cohen, : 1990, Age: 33 y.o., MRN: [id number], Sex: female    Diagnosis  Pre-op Diagnosis      * Abnormal uterine bleeding due to atypical endometrial hyperplasia [N93.9, N85.02] Post-op Diagnosis     * Abnormal uterine bleeding due to atypical endometrial hyperplasia [N93.9, N85.02]     Procedures  HYSTEROSCOPY, D&C WITH MYOSURE  61864 - [place] HYSTEROSCOPY REMOVAL LEIOMYOMATA    [place] HYSTEROSCOPY BX ENDOMETRIUM&/POLYPC W/WO D&C [89306]  Surgeons      * Aureliano SWEET Llamas - Primary    Resident/Fellow/Other Assistant:  Surgeons and Role:  * No surgeons found with a matching role *    Staff:   Jefersonulator: Thalia Paredesub Person: Alfredo GARRIDOA: Elliot    Anesthesia Staff: Anesthesiologist: Colin Davalos MD  CRNA: JERO Bonilla-DANIELLE    Procedure Summary  Anesthesia: Anesthesia type not filed in the log.  ASA: III  Estimated Blood Loss: 10mL  Intra-op Medications:   Administrations occurring from 1005 to 1120 on 24:   Medication Name Total Dose   aprepitant (Emend) capsule 40 mg 40 mg   dexAMETHasone (Decadron) injection 4 mg/mL 8 mg   dexMEDETOMidine 4 mcg/mL in NS syringe 20 mcg   fentaNYL (Sublimaze) injection 50 mcg/mL 100 mcg   HYDROmorphone (Dilaudid) injection 2 mg/mL 0.8 mg   ketorolac (Toradol) injection 30 mg 15 mg   lidocaine (cardiac) injection 2% prefilled syringe 100 mg   midazolam PF (Versed) injection 1 mg/mL 2 mg   ondansetron (Zofran) 2 mg/mL injection 4 mg   propofol (Diprivan) injection 10 mg/mL 200 mg   scopolamine (Transderm-Scop) patch 1 patch 1 patch   succinylcholine (Anectine) 20 mg/mL injection 100 mg              Anesthesia Record               Intraprocedure I/O Totals          Output    Est. Blood Loss 10 mL    Total Output 10 mL          Specimen:   ID Type Source Tests Collected by Time   1 : ENDOMETRIAL CURETTINGS Tissue ENDOMETRIUM CURETTINGS SURGICAL PATHOLOGY EXAM  Aureliano Llamas MD 11/20/2024 1117      Preop diagnosis: atypical endometrial hyperplasia with failed medical management  Postop diagnosis: same    Findings: IUD in place, removed. Fluffy endometrium with polypoid tissue throughout    Indications: Suze Cohen is an 33 y.o. female who is having surgery for Abnormal uterine bleeding due to atypical endometrial hyperplasia [N93.9, N85.02].     The patient was seen in the preoperative area. The risks, benefits, complications, treatment options, non-operative alternatives, expected recovery and outcomes were discussed with the patient. The possibilities of reaction to medication, pulmonary aspiration, injury to surrounding structures, bleeding, recurrent infection, the need for additional procedures, failure to diagnose a condition, and creating a complication requiring transfusion or operation were discussed with the patient. The patient concurred with the proposed plan, giving informed consent.  The site of surgery was properly noted/marked if necessary per policy. The patient has been actively warmed in preoperative area. Preoperative antibiotics are not indicated. Venous thrombosis prophylaxis have been ordered including bilateral sequential compression devices    Procedure Details: The patient was taken to the operating room where anesthesia was found to be adequate. She was placed in the dorsal lithotomy position. SCDs were in place. Vaginal prep was performed.   A speculum was inserted into the vagina and the anterior lip was grasped with a single tooth tenaculum. Her cervix was serially dilated to allow passage of the hysteroscope. Findings noted as above. Gentle endometrial curettage was then performed with the Myosure Lite under direct visualization. Specimen was sent to pathology. All instruments were removed from the vagina. The patient tolerated the procedure well. All counts were correct per nursing staff. The patient was returned to the recovery  room in stable condition.   Complications:  None; patient tolerated the procedure well.    Disposition: PACU - hemodynamically stable.  Condition: stable       Attending Attestation: I performed the procedure.    Aureliano Llamas  Phone Number: [phone]

## 2024-11-20 NOTE — ANESTHESIA POSTPROCEDURE EVALUATION
Patient: Suze Cohen    Procedure Summary       Date: 11/20/24 Room / Location: [facility] / [facility]    Anesthesia Start: 1032 Anesthesia Stop: 1137    Procedure: HYSTEROSCOPY, D&C WITH MYOSURE (Uterus) Diagnosis:       Abnormal uterine bleeding due to atypical endometrial hyperplasia      (Abnormal uterine bleeding due to atypical endometrial hyperplasia [N93.9, N85.02])    Surgeons: Aureliano Llamas MD Responsible Provider: Colin Davalos MD    Anesthesia Type: general ASA Status: 3            Anesthesia Type: general    Vitals Value Taken Time   /68 11/20/24 1202   Temp 36.2 °C (97.2 °F) 11/20/24 1132   Pulse 80 11/20/24 1202   Resp 16 11/20/24 1202   SpO2 95 % 11/20/24 1202       Anesthesia Post Evaluation    Patient location during evaluation: PACU  Patient participation: complete - patient participated  Level of consciousness: awake  Pain management: adequate  Multimodal analgesia pain management approach  Airway patency: patent  Two or more strategies used to mitigate risk of obstructive sleep apnea  Cardiovascular status: acceptable  Respiratory status: acceptable  Hydration status: acceptable  Postoperative Nausea and Vomiting: none        There were no known notable events for this encounter.     Amanda Bills RN  Outcome: Ongoing  Goal: Maintain absence of muscle cramping  1/15/2022 0038 by Gina Griffith RN  Outcome: Ongoing  1/14/2022 1049 by Edyta Alfaro RN  Outcome: Ongoing  Goal: Maintain normal serum potassium, sodium, calcium, phosphorus, and pH  1/15/2022 0038 by Gina Griffith RN  Outcome: Ongoing  1/14/2022 1049 by Edyta Alfaro RN  Outcome: Ongoing     Problem: Loneliness or Risk for Loneliness  Goal: Demonstrate positive use of time alone when socialization is not possible  1/15/2022 0038 by Gina Griffith RN  Outcome: Ongoing  1/14/2022 1049 by Edyta Alfaro RN  Outcome: Ongoing     Problem: Fatigue  Goal: Verbalize increase energy and improved vitality  1/15/2022 0038 by Gina Griffith RN  Outcome: Ongoing  1/14/2022 1049 by Edyta Alfaro RN  Outcome: Ongoing     Problem: Patient Education: Go to Patient Education Activity  Goal: Patient/Family Education  1/15/2022 0038 by Gina Griffith RN  Outcome: Ongoing  1/14/2022 1049 by Edyta Alfaro RN  Outcome: Ongoing     Problem: Anxiety:  Goal: Level of anxiety will decrease  Description: Level of anxiety will decrease  1/15/2022 0038 by Gina Griffith RN  Outcome: Ongoing  1/14/2022 1049 by Edyta Alfaro RN  Outcome: Ongoing     Problem: Breathing Pattern - Ineffective:  Goal: Able to breathe comfortably  Description: Able to breathe comfortably  1/15/2022 0038 by Gina Griffith RN  Outcome: Ongoing  1/14/2022 1049 by Edyta Alfaro RN  Outcome: Ongoing     Problem: Fluid Volume - Imbalance:  Goal: Absence of imbalanced fluid volume signs and symptoms  Description: Absence of imbalanced fluid volume signs and symptoms  1/15/2022 0038 by Gina Griffith RN  Outcome: Ongoing  1/14/2022 1049 by Edyta Alfaro RN  Outcome: Ongoing  Goal: Absence of intrapartum hemorrhage signs and symptoms  Description: Absence of intrapartum hemorrhage signs and symptoms  1/15/2022 0038 by Gina Griffith RN  Outcome: Ongoing  2022 1049 by Enrike Moya RN  Outcome: Ongoing     Problem: Infection - Intrapartum Infection:  Goal: Will show no infection signs and symptoms  Description: Will show no infection signs and symptoms  1/15/2022 0038 by Krystal Smith RN  Outcome: Ongoing  2022 by Enrike Moya RN  Outcome: Ongoing     Problem: Labor Process - Prolonged:  Goal: Labor progression, first stage, within specified pattern  Description: Labor progression, first stage, within specified pattern  1/15/2022 0038 by Krystal Smith RN  Outcome: Ongoing  2022 104 by Enrike Moya RN  Outcome: Ongoing  Goal: Labor progession, second stage, within specified pattern  Description: Labor progession, second stage, within specified pattern  1/15/2022 0038 by Krystal Smith RN  Outcome: Ongoing  2022 by Enrike Moya RN  Outcome: Ongoing  Goal: Uterine contractions within specified parameters  Description: Uterine contractions within specified parameters  1/15/2022 0038 by Krystal Smith RN  Outcome: Ongoing  2022 by Enrike Moya RN  Outcome: Ongoing     Problem:  Screening:  Goal: Ability to make informed decisions regarding treatment has improved  Description: Ability to make informed decisions regarding treatment has improved  1/15/2022 0038 by Krystal Smith RN  Outcome: Ongoing  2022 by Enrike Moya RN  Outcome: Ongoing     Problem: Pain - Acute:  Goal: Pain level will decrease  Description: Pain level will decrease  1/15/2022 0038 by Krystal Smith RN  Outcome: Ongoing  2022 by Enrike Moya RN  Outcome: Ongoing  Goal: Able to cope with pain  Description: Able to cope with pain  1/15/2022 0038 by Krystal Smith RN  Outcome: Ongoing  2022 by Enrike Moya RN  Outcome: Ongoing     Problem: Tissue Perfusion - Uteroplacental, Altered:  Goal: Absence of abnormal fetal heart rate pattern  Description: Absence of abnormal fetal heart rate pattern  1/15/2022 0038 by Pardeep Pack RN  Outcome: Ongoing  1/14/2022 1049 by Damari Woo RN  Outcome: Ongoing     Problem: Urinary Retention:  Goal: Experiences of bladder distention will decrease  Description: Experiences of bladder distention will decrease  1/15/2022 0038 by Pardeep Pack RN  Outcome: Ongoing  1/14/2022 1049 by Damari Woo RN  Outcome: Ongoing  Goal: Urinary elimination within specified parameters  Description: Urinary elimination within specified parameters  1/15/2022 0038 by Pardeep Pack RN  Outcome: Ongoing  1/14/2022 1049 by Damari Woo RN  Outcome: Ongoing     Problem: Pain:  Goal: Pain level will decrease  Description: Pain level will decrease  1/15/2022 0038 by Pardeep Pack RN  Outcome: Ongoing  1/14/2022 1049 by Damari Woo RN  Outcome: Ongoing  Goal: Control of acute pain  Description: Control of acute pain  Outcome: Ongoing  Goal: Control of chronic pain  Description: Control of chronic pain  Outcome: Ongoing

## 2024-11-20 NOTE — H&P
History Of Present Illness   33 y.o.  with a history of AUB and significant h/o atypical endometrial hyperplasia who is presenting today for hysteroscopy, D&C  No acute concerns.      HPI 9/2024  EMB Pathology results 09/09/2024:  Atypical endometrial hyperplasia in a background of inactive endometrium      Hx: 09/09/2024:  EMB performed in office by Dr. Llamas.   Amenorrheic on Mirena IUD, inserted 4/2024  Occasional crampy pelvic pain  Desires fertility. Has a partner. Would like to lose weight prior to attempting pregnancy in the next few years to optimize health of the pregnancy.     Hx: 04/01/2024:   Mirena IUD inserted for contraception and h/o atypical endometrial hyperplasia     Hx 3/2024: S/p hysteroscopy d&c, polypectomy with myosure   Pathology: endometrial hyperplasia without atypia, fragments of endometrial polyp  D/w GYN oncology, Dr. Samayoa who recommended Mirena IUD and repeat EMB in 6 months.      Hx: 01/12/2024:   Endometrium, biopsy:12/18/2023  -- Endometrial hyperplasia without atypia  -- Features suggestive of endometrial polyp     AUB Hx: 11/10/2023  AUB:  Bleeding on and off since 8/2023. She bleed every day for a month. A few nights she woke up and bled to the sheets. Sometimes she has to wear a pad and other days she did not need to wear anything. She did not have a period prior to August. She hopes to become pregnant. Last sexual intercourse was today. She was to follow-up in office for continued management if she did not become pregnant.   BMI 51     Known PCOS and atypical endometrial hyperplasia. Previously had IUD for management of endometrial hyperplasia. She had it removed 7/2021 as she wished to become pregnant.      Last EMB 5/2021 inactive endometrium.   Pt with atypical hyperplasia on EMB and subsequent D&C pathology was the same.   s/p D&C 6/19/19 for further characterization and pathology was the same:  s/p GYN oncology visit with Dr. Hernandez with recs for Mirena IUD (placed  "7/2019) and serial EMB's q6 months unless  she becomes symptomatic with concerning bleeding and will be for EMB at that time  Mirena IUD placed 7/1/19.   Per Dr. Hernandez's recommendations, she is for EMB at 4 months and if pathology is reassuring, she will be   serial EMBs negative: 11/2019, 4/2020, 10/2020, 5/2021     OB hx: Nulligravida  GYN hx:   - menarche, menstrual pattern, LMP: AUB  - Sexual activity/issues, STI protection/history, birth control: Sexually active without issues. No concern for STIs  - vaginal/vulvar lesions, irritation, or abnormal discharge: no  - HPV vaccine: complete  FH: No GYN related cancers including breast, ovarian, endometrial, cervical, or colon cancer.      ROS is notable for none except occasional pelvic cramping  10 point ROS negative except as listed above.      VITAL SIGNS  /78   Ht 1.778 m (5' 10\")   Wt (!) 176 kg (388 lb 9.6 oz)   BMI 55.76 kg/m²       Physical Exam  Constitutional:       Appearance: Normal appearance.   HENT:      Head: Normocephalic and atraumatic.   Eyes:      Extraocular Movements: Extraocular movements intact.      Conjunctiva/sclera: Conjunctivae normal.      Pupils: Pupils are equal, round, and reactive to light.   Pulmonary:      Effort: Pulmonary effort is normal.   Skin:     General: Skin is dry.   Neurological:      General: No focal deficit present.      Mental Status: She is alert.   Psychiatric:         Mood and Affect: Mood normal.         Behavior: Behavior normal.         Thought Content: Thought content normal.         Judgment: Judgment normal.            Assessment/Plan   Recurrent atypical endometrial hyperplasia (EIN)  Currently on Mirena IUD with previous endometrial pathology negative for atypia.  Appt pending with GYN oncologist Dr. Samayoa in December once pathology returns  For hysteroscopy d&c. Proceed to OR     Past Medical History  Past Medical History:   Diagnosis Date    Class 3 severe obesity with body mass index " "(BMI) of 50.0 to 59.9 in adult 01/12/2024    50.51 kg/m²    Encounter for pregnancy test, result unknown 04/06/2020    Encounter for pregnancy test    Endometrial hyperplasia     atypical cells    Endometrial polyp     Female infertility     Other specified anxiety disorders 08/29/2016    Depression with anxiety    Personal history of other diseases of the female genital tract     History of polycystic ovarian syndrome    Personal history of other endocrine, nutritional and metabolic disease     History of morbid obesity    Personal history of other infectious and parasitic diseases     History of chickenpox    Polycystic ovary syndrome     PONV (postoperative nausea and vomiting)        Surgical History  Past Surgical History:   Procedure Laterality Date    DILATION AND CURETTAGE OF UTERUS  03/06/2024    x2    HYSTEROSCOPY          Social History  She reports that she has never smoked. She has never used smokeless tobacco. She reports current alcohol use. She reports that she does not use drugs.    Family History  Family History   Problem Relation Name Age of Onset    Hypertension Mother Trina Betts     Hyperlipidemia Mother Trina Betts     Breast cancer Mother Trina Betts     Hypertension Father James Cohen     Hyperlipidemia Father James Cohen     Diabetes Father James Cohen     Heart disease Father James Cohen     Other (ANXIETY AND DEPRESSION) Sister      Other (LEUKEMIA, IN REMISSION) Maternal Grandmother      Diabetes type II Maternal Grandfather      Other (CARDIAC DISORDER) Paternal Grandfather      Diabetes type II Paternal Grandfather          Allergies  Extra eye drops and Adhesive tape-silicones     Last Recorded Vitals  Blood pressure (!) 172/95, pulse 87, temperature 36.4 °C (97.5 °F), resp. rate 16, height 1.778 m (5' 10\"), weight (!) 175 kg (386 lb 0.4 oz), SpO2 98%.    Aureliano Llamas MD    "

## 2024-11-20 NOTE — ANESTHESIA PROCEDURE NOTES
Airway  Date/Time: 11/20/2024 10:42 AM  Urgency: elective    Airway not difficult    Staffing  Performed: CRNA   Authorized by: Colin Davalos MD    Performed by: JERO Bonilla-DANIELLE  Patient location during procedure: OR    Indications and Patient Condition  Indications for airway management: anesthesia and airway protection  Spontaneous Ventilation: absent  Sedation level: deep  Preoxygenated: yes  Patient position: sniffing  Mask difficulty assessment: 1 - vent by mask  Planned trial extubation    Final Airway Details  Final airway type: endotracheal airway      Successful airway: ETT  Cuffed: yes   Successful intubation technique: video laryngoscopy ([place])  Facilitating devices/methods: intubating stylet  Endotracheal tube insertion site: oral  Blade: Nicolle  Blade size: #4  ETT size (mm): 7.5  Cormack-Lehane Classification: grade I - full view of glottis  Placement verified by: chest auscultation, capnometry and palpation of cuff   Measured from: lips  ETT to lips (cm): 22  Number of attempts at approach: 1

## 2024-11-20 NOTE — DISCHARGE INSTRUCTIONS
[facility]  [facility]  [address] [place]  [address]  Tel: [phone]  ([place]) or [phone] ([place])  Home Going Instructions after Hysteroscopy:    Activity:   You should rest on the day of surgery  You may not return to work until cleared by your surgeon. This is discussed before the surgery and will be revisited postoperatively if there are any changes. If you have [facility] paperwork to be signed, please fill out your portion and drop off at either the [place] or [place] offices to be reviewed and signed.   You may have light bleeding or spotting for a few weeks.   Use only sanitary pads for bleeding, do not use tampons  Please abstain from intercourse until you are cleared by your surgeon  Please do not fully submerge in water (pool/hot tub/bath tub) even in your own home. Shower is fine.  Do not drive for 24 hours after anesthesia, while taking narcotic pain management, and while requiring short interval Tylenol/Ibuprofen for pain   Do not consume alcohol for 24 hours after anesthesia or while using any narcotic pain medication    Anesthesia:  Drink small amounts of liquids initially and then slowly increase your intake of food. Drinking fluids will keep your bowels regular.   Avoid foods that are sweet, spicy or hard to digest today.  If you feel nauseated, rest your stomach for one hour, and then try drinking clear liquids again.  You may take a stool softener or miralax/milk of magnesia to help with constipation that may occur after anesthesia.  Please make sure a responsible adult is with you for at least 24 hours after surgery and do not drive or make important decisions during this time. Anesthesia may affect your judgment, coordination, and reaction time.    Pain:  If you experience any post-op pain, pain can be managed by taking Ibuprofen and/or Tylenol.     You may additionally use heat or cool packs to alleviate discomfort    Follow  up:  Follow up with your surgeon in the office 2 weeks after your procedure    When to call your provider:  Vaginal bleeding that is more than a normal period that doesn't taper down.  Bleeding through 1 sanitary pad an hour.  Any clots the size of an egg or bigger.  Fever of 100.4 or higher with chills.  Unusual or foul smelling discharge.  Worsening pelvic or abdominal pain.  Always call the office to be connected with your surgeon or the physician on call for the practice. If your call is during office hours (Monday through Friday 9:00 AM to 4:00 PM) press 1 to be connected to the . If you are calling after hours you will be transferred to our answering service and they will connect you with the physician on call.    @[email or web address] Llamas MD@

## 2024-11-20 NOTE — ANESTHESIA PREPROCEDURE EVALUATION
Patient: Suze Cohen    Procedure Information       Date/Time: 11/20/24 1005    Procedure: HYSTEROSCOPY, D&C WITH MYOSURE    Location: [facility] / [facility]    Surgeons: Aureliano Llamas MD          There were no vitals filed for this visit.    Past Surgical History:   Procedure Laterality Date    DILATION AND CURETTAGE OF UTERUS  03/06/2024    x2    HYSTEROSCOPY       Past Medical History:   Diagnosis Date    Class 3 severe obesity with body mass index (BMI) of 50.0 to 59.9 in adult 01/12/2024    50.51 kg/m²    Encounter for pregnancy test, result unknown 04/06/2020    Encounter for pregnancy test    Endometrial hyperplasia     atypical cells    Endometrial polyp     Female infertility     Other specified anxiety disorders 08/29/2016    Depression with anxiety    Personal history of other diseases of the female genital tract     History of polycystic ovarian syndrome    Personal history of other endocrine, nutritional and metabolic disease     History of morbid obesity    Personal history of other infectious and parasitic diseases     History of chickenpox    Polycystic ovary syndrome     PONV (postoperative nausea and vomiting)      No current facility-administered medications for this encounter.  Prior to Admission medications    Medication Sig Start Date End Date Taking? Authorizing Provider   albuterol 90 mcg/actuation inhaler INHALE 1 TO 2 PUFFS EVERY 4 TO 6 HOURS AS NEEDED FOR WHEEZE FOR UP TO 30 DAYS 8/16/23   Historical Provider, MD   L. acidophilus/Bifid. animalis 32 billion cell capsule Take by mouth.    Historical Provider, MD   latanoprost (Xalatan) 0.005 % ophthalmic solution  5/24/23   Historical Provider, MD   levonorgestrel (Mirena) 21 mcg/24 hr (8 yrs) 52 mg IUD 52 mg by intrauterine route 1 time.    Historical Provider, MD   multivitamin with minerals tablet Take 1 tablet by mouth once daily. HAIR, SKIN AND NAILS FORMULA  Patient not taking: Reported on 11/5/2024    Historical Provider,  "MD     Allergies   Allergen Reactions    Extra Eye Drops Other     STEROIDAL EYE DROPS--causes HTN in the eyes.      Social History     Tobacco Use    Smoking status: Never    Smokeless tobacco: Never   Substance Use Topics    Alcohol use: Yes     Comment: Rarely 1-2x a year         Chemistry    Lab Results   Component Value Date/Time     11/05/2024 1429    K 4.2 11/05/2024 1429     11/05/2024 1429    CO2 28 11/05/2024 1429    BUN 11 11/05/2024 1429    CREATININE 0.69 11/05/2024 1429    Lab Results   Component Value Date/Time    CALCIUM 9.4 11/05/2024 1429          Lab Results   Component Value Date/Time    WBC 10.1 11/05/2024 1429    HGB 13.3 11/05/2024 1429    HCT 40.2 11/05/2024 1429     11/05/2024 1429     No results found for: \"PROTIME\", \"PTT\", \"INR\"  No results found for this or any previous visit (from the past 4464 hours).  No results found for this or any previous visit from the past 1095 days.      Relevant Problems   GI   (+) Gastroesophageal reflux disease      Endocrine   (+) Obesity, morbid (Multi)      HEENT   (+) Glaucoma      GYN   (+) Abnormal uterine bleeding (AUB)   (+) Abnormal uterine bleeding due to atypical endometrial hyperplasia   (+) Polycystic ovarian syndrome       Clinical information reviewed:                   NPO Detail:  No data recorded     Physical Exam    Airway  Mallampati: II     Cardiovascular - normal exam     Dental    Pulmonary    Abdominal            Anesthesia Plan    History of general anesthesia?: yes  History of complications of general anesthesia?: no    ASA 3     general     The patient is not a current smoker.  Patient was not previously instructed to abstain from smoking on day of procedure.  Patient did not smoke on day of procedure.  Education provided regarding risk of obstructive sleep apnea.  intravenous induction   Anesthetic plan and risks discussed with patient.    Plan discussed with CRNA.      "

## 2024-12-10 ENCOUNTER — APPOINTMENT (OUTPATIENT)
Dept: GYNECOLOGIC ONCOLOGY | Facility: CLINIC | Age: 34
End: 2024-12-10
Payer: COMMERCIAL

## 2024-12-11 NOTE — PROGRESS NOTES
"Patient ID: Suze Cohen is a 34 y.o. female.  Referring Physician: No referring provider defined for this encounter.  Primary Care Provider: No Assigned PCP Generic Provider, MD      Subjective    HPI  34 y.o.  with diagnosis of PCOS and CAH diagnosed June 2019, managed with Mirena IUD with response since July 2019. Serial EMBs have remained negative    Her Mirena IUD was removed 11/2024, it was placed 4/1/24. Her hysteroscopy and D&C were done 11/202/24 by Dr. Llamas. She is going to the bathroom normally and eating and drinking normally, denies abnormal vaginal discharge and spotting/bleeding. States she was on Letrozole without success, desires future fertility and has not seen fertility providers.     July 2019: D&C confirmed CAH  Nov 2019: EMB negative  April 2020: EMB negative  10/2020: EMB negative  May 2021: EMB negative, chronic endometritis  Negative EMBs until 12/2023  Since 2023: Persistent hyperplasia without atypia, IUD temporarily in place during this time      She returns to Gyn Oncology for clearance prior to conception.      PMH: PCOS, depression/anxiety, hypothyroidism, BMI 47  PSH: D&C  OBGYN: G0, menarche at 13, oligomenorrhea and hyperplasia per HPI  FamHx: no history of gyn related malignancy, including breast, ovarian, endometrial, cervical or colon cancer. 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, RN at [employer]  Meds: MVI, Mirena IUD  NKDA    Review of Systems - Oncology     Objective   BSA: 1.99 meters squared  /75 (BP Location: Left arm, Patient Position: Sitting, BP Cuff Size: Large adult)   Pulse 80   Temp 36.9 °C (98.4 °F) (Core)   Resp 16   Ht 1.778 m (5' 10\")   Wt 80.4 kg (177 lb 4.8 oz)   SpO2 97%   BMI 25.44 kg/m²      Family History   Problem Relation Name Age of Onset    Hypertension Mother Trina Betts     Hyperlipidemia Mother Trina Betts     Breast cancer Mother Trina Betts     Hypertension Father James Cohen     Hyperlipidemia " Father James Cohen     Diabetes Father James Cohen     Heart disease Father James Cohen     Other (ANXIETY AND DEPRESSION) Sister      Other (LEUKEMIA, IN REMISSION) Maternal Grandmother      Diabetes type II Maternal Grandfather      Other (CARDIAC DISORDER) Paternal Grandfather      Diabetes type II Paternal Grandfather         Suze Cohen  reports that she has never smoked. She has never used smokeless tobacco.  She  reports current alcohol use.  She  reports no history of drug use.    Physical Exam  Constitutional:       General: She is not in acute distress.     Appearance: Normal appearance. She is not toxic-appearing.   HENT:      Head: Normocephalic.      Mouth/Throat:      Mouth: Mucous membranes are moist.      Pharynx: Oropharynx is clear.   Eyes:      Extraocular Movements: Extraocular movements intact.      Conjunctiva/sclera: Conjunctivae normal.      Pupils: Pupils are equal, round, and reactive to light.   Cardiovascular:      Rate and Rhythm: Normal rate and regular rhythm.      Heart sounds: Normal heart sounds. No murmur heard.     No friction rub. No gallop.   Pulmonary:      Effort: Pulmonary effort is normal.      Breath sounds: Normal breath sounds. No wheezing or rhonchi.   Abdominal:      General: Bowel sounds are normal. There is no distension.      Palpations: Abdomen is soft.      Tenderness: There is no abdominal tenderness.   Musculoskeletal:         General: Normal range of motion.      Cervical back: Normal range of motion.   Skin:     General: Skin is warm.   Neurological:      General: No focal deficit present.      Mental Status: She is alert and oriented to person, place, and time.   Psychiatric:         Mood and Affect: Mood normal.         Behavior: Behavior normal.         Performance Status:  Asymptomatic    Assessment/Plan      Oncology History    No history exists.     34 y.o. with medically managed endometrial hyperplasia, interested in fertility-sparing  treatment     # Endometrial hyperplasia  - Discussed the significance of atypical hyperplasia and its underlying risk of occult endometrial cancer  - Discussed management options including medical and surgical.   - She is interested in getting pregnant. Tried previously (3 years ago) with three cycles using letrozole, did not achieve pregnancy at that time  - IUD removed in November, still showing endometrial hyperplasia without atypia  - Discussed referral to [facility] and provera 10mg  - Plan for follow-up in 6 months  - Will work with [facility] to determine timing/feasibility of pregnancy      Scribe Attestation  By signing my name below, I, Rio Dempsey   attest that this documentation has been prepared under the direction and in the presence of Suze Samayoa MD.     Provider Attestation - Scribe documentation    All medical record entries made by the Scribe were at my direction and personally dictated by me. I have reviewed the chart and agree that the record accurately reflects my personal performance of the history, physical exam, discussion and plan.    Suze Samayoa MD

## 2024-12-12 ENCOUNTER — OFFICE VISIT (OUTPATIENT)
Dept: GYNECOLOGIC ONCOLOGY | Facility: CLINIC | Age: 34
End: 2024-12-12
Payer: COMMERCIAL

## 2024-12-12 VITALS
BODY MASS INDEX: 25.38 KG/M2 | RESPIRATION RATE: 16 BRPM | HEART RATE: 80 BPM | TEMPERATURE: 98.4 F | DIASTOLIC BLOOD PRESSURE: 75 MMHG | SYSTOLIC BLOOD PRESSURE: 117 MMHG | OXYGEN SATURATION: 97 % | WEIGHT: 177.3 LBS | HEIGHT: 70 IN

## 2024-12-12 DIAGNOSIS — N85.00 ENDOMETRIAL HYPERPLASIA: Primary | ICD-10-CM

## 2024-12-12 PROCEDURE — 3008F BODY MASS INDEX DOCD: CPT | Performed by: STUDENT IN AN ORGANIZED HEALTH CARE EDUCATION/TRAINING PROGRAM

## 2024-12-12 PROCEDURE — 1036F TOBACCO NON-USER: CPT | Performed by: STUDENT IN AN ORGANIZED HEALTH CARE EDUCATION/TRAINING PROGRAM

## 2024-12-12 PROCEDURE — 99204 OFFICE O/P NEW MOD 45 MIN: CPT | Performed by: STUDENT IN AN ORGANIZED HEALTH CARE EDUCATION/TRAINING PROGRAM

## 2024-12-12 PROCEDURE — 99214 OFFICE O/P EST MOD 30 MIN: CPT | Performed by: STUDENT IN AN ORGANIZED HEALTH CARE EDUCATION/TRAINING PROGRAM

## 2024-12-12 RX ORDER — MEDROXYPROGESTERONE ACETATE 10 MG/1
10 TABLET ORAL DAILY
Qty: 30 TABLET | Refills: 11 | Status: SHIPPED | OUTPATIENT
Start: 2024-12-12 | End: 2025-12-12

## 2024-12-12 ASSESSMENT — ENCOUNTER SYMPTOMS
DEPRESSION: 0
OCCASIONAL FEELINGS OF UNSTEADINESS: 0

## 2024-12-12 ASSESSMENT — PAIN SCALES - GENERAL: PAINLEVEL_OUTOF10: 0-NO PAIN

## 2025-04-15 ASSESSMENT — LIFESTYLE VARIABLES
HISTORY_ALCOHOL_USE: NO
TOBACCO_USE: NO

## 2025-04-16 ENCOUNTER — CONSULT (OUTPATIENT)
Dept: ENDOCRINOLOGY | Facility: CLINIC | Age: 35
End: 2025-04-16
Payer: COMMERCIAL

## 2025-04-16 ENCOUNTER — APPOINTMENT (OUTPATIENT)
Dept: LAB | Facility: HOSPITAL | Age: 35
End: 2025-04-16
Payer: COMMERCIAL

## 2025-04-16 VITALS
BODY MASS INDEX: 41.95 KG/M2 | OXYGEN SATURATION: 99 % | TEMPERATURE: 97.2 F | DIASTOLIC BLOOD PRESSURE: 84 MMHG | SYSTOLIC BLOOD PRESSURE: 136 MMHG | RESPIRATION RATE: 17 BRPM | HEIGHT: 70 IN | WEIGHT: 293 LBS | HEART RATE: 83 BPM

## 2025-04-16 DIAGNOSIS — Z11.59 ENCOUNTER FOR SCREENING FOR OTHER VIRAL DISEASES: ICD-10-CM

## 2025-04-16 DIAGNOSIS — Z13.1 SCREENING FOR DIABETES MELLITUS: ICD-10-CM

## 2025-04-16 DIAGNOSIS — Z11.3 SCREENING FOR STDS (SEXUALLY TRANSMITTED DISEASES): ICD-10-CM

## 2025-04-16 DIAGNOSIS — Z31.41 FERTILITY TESTING: ICD-10-CM

## 2025-04-16 DIAGNOSIS — N91.2 AMENORRHEA: ICD-10-CM

## 2025-04-16 DIAGNOSIS — Z01.83 ENCOUNTER FOR BLOOD TYPING: Primary | ICD-10-CM

## 2025-04-16 DIAGNOSIS — Z01.83 ENCOUNTER FOR RH BLOOD TYPING: ICD-10-CM

## 2025-04-16 DIAGNOSIS — N85.00 ENDOMETRIAL HYPERPLASIA: ICD-10-CM

## 2025-04-16 DIAGNOSIS — E66.09 OBESITY DUE TO EXCESS CALORIES WITHOUT SERIOUS COMORBIDITY, UNSPECIFIED CLASS: ICD-10-CM

## 2025-04-16 DIAGNOSIS — Z13.29 SCREENING FOR THYROID DISORDER: Primary | ICD-10-CM

## 2025-04-16 LAB
ABO GROUP (TYPE) IN BLOOD: NORMAL
ANTIBODY SCREEN: NORMAL
RH FACTOR (ANTIGEN D): NORMAL

## 2025-04-16 PROCEDURE — 86900 BLOOD TYPING SEROLOGIC ABO: CPT

## 2025-04-16 PROCEDURE — 99214 OFFICE O/P EST MOD 30 MIN: CPT | Performed by: NURSE PRACTITIONER

## 2025-04-16 PROCEDURE — 86901 BLOOD TYPING SEROLOGIC RH(D): CPT

## 2025-04-16 PROCEDURE — 86850 RBC ANTIBODY SCREEN: CPT

## 2025-04-16 PROCEDURE — 36415 COLL VENOUS BLD VENIPUNCTURE: CPT

## 2025-04-16 ASSESSMENT — PATIENT HEALTH QUESTIONNAIRE - PHQ9
SUM OF ALL RESPONSES TO PHQ9 QUESTIONS 1 AND 2: 0
2. FEELING DOWN, DEPRESSED OR HOPELESS: NOT AT ALL
1. LITTLE INTEREST OR PLEASURE IN DOING THINGS: NOT AT ALL

## 2025-04-16 ASSESSMENT — PAIN SCALES - GENERAL: PAINLEVEL_OUTOF10: 0-NO PAIN

## 2025-04-16 NOTE — PROGRESS NOTES
Visit Type: In Person  MD reviewed, Authorization obtained to share with partner.    NEW FERTILITY PATIENT VISIT    Referred by: Dr Samayoa    Accompanied today by: Partner- Juan F Guerra       Suze Cohen is a 34 y.o.  female who presents with    referral by Dr. Samayoa.  Diagnosed with endometrial hyperplasia wanting options.    Currently taking progesterone daily. Took IUD in 2024.    Have you had any concerns about your fertility treatments so far?     What are you goals for today's visit? Figure out next steps in having a baby and my options.     What causes of infertility have been identified on your workup so far? PCOS, endometrial hyperplasia, obesity     Past Infertility Treatments: Unsure      Please summarize your fertility treatments to date.       As far as you are aware, do you have insurance coverage for fertility diagnostic testing and/or fertility treatments? Yes      PRIOR EVALUATION / TREATMENT  4 years ago did letrozole x 2 months and TI. - had regular cycles.    Hysterosalpingogram: na  Saline Infused Sonography: na  GYN Pelvic Ultrasound: na  Other:  na  Prior Labs  Lab Results    Date Done      AMH: No results found for requested labs within last 1825 days. No results found for requested labs within last 1825 days.   TSH: 2.13 (Ref range: 0.44 - 3.98 mIU/L) 11/10/2023   PRL: 11.3 (Ref range: 3.0 - 20.0 ug/L) 11/10/2023   Testosterone: No results found for requested labs within last 1825 days. No results found for requested labs within last 1825 days.   DHEAS: 221 (Ref range: 65 - 395 ug/dL) 11/10/2023   FSH: No results found for requested labs within last 1825 days. No results found for requested labs within last 1825 days.   17 OHP: No results found for requested labs within last 1825 days. No results found for requested labs within last 1825 days.   HgbA1c: 5.3 (Ref range: see below %) 11/10/2023   Hepatitis B surface antigen: No results found for requested labs  within last 1825 days. No results found for requested labs within last 1825 days.   Hepatitis C antibody: No results found for requested labs within last 1825 days. No results found for requested labs within last 1825 days.   HIV ½ Antigen Antibody screen with reflex: No results found for requested labs within last 1825 days. No results found for requested labs within last 1825 days.   Syphilis screening with reflex: No results found for requested labs within last 1825 days. No results found for requested labs within last 1825 days.   GC: NEGATIVE (Ref range: Negative) 5/3/2021   CT: NEGATIVE (Ref range: Negative) 5/3/2021   Type and Screen: No results found for requested labs within last 1825 days. No results found for requested labs within last 1825 days.   Rh: No results found for requested labs within last 1825 days. No results found for requested labs within last 1825 days.   Antibody: No results found for requested labs within last 1825 days. No results found for requested labs within last 1825 days.   Rubella: 159.00 (Ref range: IU/ml) 7/14/2022   Varicella: No results found for requested labs within last 1825 days. No results found for requested labs within last 1825 days.   Hemoglobin: No results found for requested labs within last 1825 days. No results found for requested labs within last 1825 days.   Hematocrit: No results found for requested labs within last 1825 days. No results found for requested labs within last 1825 days.   Creatinine: 0.69 (Ref range: 0.50 - 1.05 mg/dL) 11/5/2024   AST:No results found for requested labs within last 1825 days. No results found for requested labs within last 1825 days.   ALT:No results found for requested labs within last 1825 days.: No results found for requested labs within last 1825 days.   Relationship Status:   Committed relationship    Have you ever been pregnant? No    How many times have you been pregnant?  0  Have you ever had a miscarriage? No    How many  times have you had a miscarriage?  0     OB Hx     OB History          0    Para   0    Term   0       0    AB   0    Living   0         SAB   0    IAB   0    Ectopic   0    Multiple   0    Live Births   0                 GYN HISTORY   Have you ever been diagnosed with a sexually transmitted disease? No    Please select all that are applicable:    Have you ever had Pelvic Inflammatory Disease? No    Have you had an abnormal PAP smear? No    Date & Result of last PAP smear:   Lab Results   Component Value Date    FINALINTERP  11/10/2023         A. THINPREP PAP CERVIX, SCREENING -     Specimen Adequacy  Satisfactory for evaluation; endocervical/transformation zone component is present  Quality Indicator: Partially obscuring blood    General Categorization  Negative for intraepithelial lesion or malignancy.    Descriptive Interpretation  Negative for intraepithelial lesion or malignancy              Have you ever had an abnormal Mammogram? No    Date & result of your last mammogram:   NA  Do you have pelvic pain? No    How many times per week do you have intercourse? 6    Do you have pain with intercourse? No    Do you use lubricants with intercourse?    Do you have pain with bowel movements? No              Do you have pain with a full bladder? No    MENSTRUAL HISTORY  LMP: I do not get periods.    Menarche: Age 13    Contraception: None  Hx of IUD, progesterone only, pills    Cycle length:  NA  Describe your bleeding: Light    Dysmenorrhea: No       ENDOCRINE/INFERTILITY HISTORY  Duration of infertility: More than 5 years    Coital Activity/week: 6    Nipple Discharge: No    Vision changes: No    Headaches: No    Excess hair growth: Yes    Excessive hair loss: Yes    Acne: Yes    Oily skin: No    Recent weight change  Weight gain: Yes    Weight loss: No    Exercise more than 3 times a week: No      PMH  Medical History[1]     MEDICATIONS  Medications Ordered Prior to Encounter[2]     PSH  Surgical  "History[3]     PSYCH HISTORY  Have you ever been diagnosed with a mental health Issue? No    Have you ever been hospitalized for a mental health disorder? No       SOCIAL HISTORY  Social History[4]  Occupation: Registered nurse    Have you ever been incarcerated? No    Do you have a history of domestic violence? No    Do you feel safe at home? Yes    Do you have a history of any negative sexual experience such as incest or rape? No       PARTNER HISTORY  Partner Name: Juan F Guerra    Partner : [id number]    Partner email: [email or web address]    Occupation: Construction/maintenance    Prior fertility history: Has a 10 year old son    PMH: Pneumothorax  Varicocele    PSH:  penumothorax    Smoking:Yes  Varies from a 1/2 pack to a pack a day.    Alcohol Use: Yes    Drug Use: Yes    Medications:  None    Injuries: No    STD: No    Please select all that are applicable:    SA: No    SA Results:  NA    FAMILY HISTORY  Family History[5]    CANCER HISTORY    Breast: Yes, mother    Ovarian: No    Colon: No    Endometrial: No      FAMILY VTE HISTORY  Family History of Blood Clots: No      GENETIC HISTORY  Ethnic Background  Patient: White    Partner: White    Genetic Disease in Family  Patient: No    Partner: No    Birth Defects in Family  Patient: No    Partner: No    Genetic screening performed previously:   No     BMI:   BMI Readings from Last 1 Encounters:   25 55.10 kg/m²     VITALS:  /84   Pulse 83   Temp 36.2 °C (97.2 °F) (Temporal)   Resp 17   Ht 1.778 m (5' 10\")   Wt (!) 174 kg (384 lb)   LMP 2024   SpO2 99%   BMI 55.10 kg/m²     ASSESSMENT   34 y.o.  female with  primary infertility x 5, suspected oligoovulation and the following pertinent medical issues: PCOS, chronic endometrial hyperplasia without atypia .  Partner SA: No Assessment    COUNSELING  We discussed causes of infertility including hormonal, egg quality issues, structural problems such as endometriosis, " adhesions, or tubal problems, uterine factors such as polyps or fibroids, and sperm issues. Reviewed evaluation of such as well. We discussed various methods for achieving pregnancy in some detail including, ovulation induction, insemination, superovulation and IVF.    We discussed the impact of age on fertility. We discussed that a woman is born with all of the follicles that she will have in her lifetime and that these numbers progressively decrease as the patient reaches menopause. We discussed that women can remain fertile into their late 30’s and even early 40’s, however, chance for success is significantly lower for women who have infertility. We also discussed the higher rates of aneuploidy and miscarriage that occur as women age.    It was discussed with the patient that there are BMI guidelines for fertility treatments at [facility].  These limitations exist to ensure the safety of our patients and the health of the subsequent pregnancy.  For IVF/egg retrieval, the BMI must be < 44 to proceed.  For embryo transfer, fertility preservation for cancer patients, and intrauterine insemination (IUI), the BMI must be < 50 to proceed.        Routine Testing [facility]  STDs Within 1 year   Genetic carrier Waiver/Completed   T&S Within 1 year   AMH Within 1 year   TSH Within 1 year   Rubella/Varicella Within 5 years     PLAN  Orders Placed This Encounter   Procedures    US pelvis transvaginal    [facility] ANTI-MULLERIAN HORMONE ([facility]H), FEMALE    TSH with reflex to Free T4 if abnormal    Hemoglobin A1C    Type And Screen Is this order related to pregnancy or an upcoming surgery? No    Rubella Antibody, Igg    Varicella Zoster Antibody, Igg    Hepatitis B surface antigen    Hepatitis C Antibody    HIV 1/2 Antigen/Antibody Screen with Reflex to Confirmation    Syphilis Screen with Reflex    C. trachomatis / N. gonorrhoeae, Amplified, Urogenital    Lipid Panel    CBC    Comprehensive  Metabolic Panel       GENETIC SCREENING PATIENT  Waiver    PARTNER  Yes Semen Analysis: NA  Yes Genetic screening: Waiver    FOLLOW UP   Consults: MFM consult: indication: obesity    Chart to primary nurse for care coordination and patient check list/education  Enroll in Engaged MD  Take prenatal vitamins, vitamin D 2000 IUs daily  Discussed that pap and mammogram must be updated per ACOG guidelines before treatment can begin  Discussed that treatment cannot proceed until checklist items are complete   6 week follow up with [facility]. Then will probably also have her follow up with MD or fellow closer to treatment time.  Additional testing for BMI < 18 or > 40: No  Dr. Carcamo- weight loss- [phone]   Also touch base with PCP about weight loss meds.  Will want clearance from oncology prior to starting meds.     MD Completion:  Ectopic Risk: No  Medically Complex: No    Fertility Plan Update: Need to work on BMI then letrozole/IUI and or IVF    Intimate Exam Performed: No, an intimate exam was not performed at this encounter.     Gely Hamlin  04/16/2025  10:07 AM         [1]   Past Medical History:  Diagnosis Date    Class 3 severe obesity with body mass index (BMI) of 50.0 to 59.9 in adult 01/12/2024    50.51 kg/m²    Encounter for pregnancy test, result unknown 04/06/2020    Encounter for pregnancy test    Endometrial hyperplasia     atypical cells    Endometrial polyp     Female infertility     Other specified anxiety disorders 08/29/2016    Depression with anxiety    Personal history of other diseases of the female genital tract     History of polycystic ovarian syndrome    Personal history of other endocrine, nutritional and metabolic disease     History of morbid obesity    Personal history of other infectious and parasitic diseases     History of chickenpox    Polycystic ovary syndrome     PONV (postoperative nausea and vomiting)    [2]   Current Outpatient Medications on File Prior to Visit    Medication Sig Dispense Refill    L. acidophilus/Bifid. animalis 32 billion cell capsule Take by mouth.      latanoprost (Xalatan) 0.005 % ophthalmic solution       medroxyPROGESTERone (Provera) 10 mg tablet Take 1 tablet (10 mg) by mouth once daily. 30 tablet 11    multivitamin with minerals tablet Take 1 tablet by mouth once daily. HAIR, SKIN AND NAILS FORMULA       No current facility-administered medications on file prior to visit.   [3]   Past Surgical History:  Procedure Laterality Date    DILATION AND CURETTAGE OF UTERUS  03/06/2024    x2    DILATION AND CURETTAGE OF UTERUS N/A     HYSTEROSCOPY     [4]   Social History  Tobacco Use    Smoking status: Never    Smokeless tobacco: Never   Vaping Use    Vaping status: Never Used   Substance Use Topics    Alcohol use: Yes     Comment: Rarely 1-2x a year    Drug use: Never   [5]   Family History  Problem Relation Name Age of Onset    Hypertension Mother Trina Betts     Hyperlipidemia Mother Trina Betts     Breast cancer Mother Trina Betts     Hypertension Father James Noel     Hyperlipidemia Father James Noel     Diabetes Father James Noel     Heart disease Father Jamesbrigitte Cohen     Other (ANXIETY AND DEPRESSION) Sister      Other (LEUKEMIA, IN REMISSION) Maternal Grandmother      Diabetes type II Maternal Grandfather      Other (CARDIAC DISORDER) Paternal Grandfather      Diabetes type II Paternal Grandfather

## 2025-04-17 ENCOUNTER — ANCILLARY PROCEDURE (OUTPATIENT)
Dept: ENDOCRINOLOGY | Facility: CLINIC | Age: 35
End: 2025-04-17
Payer: COMMERCIAL

## 2025-04-17 DIAGNOSIS — N91.2 AMENORRHEA: ICD-10-CM

## 2025-04-17 LAB
ALBUMIN SERPL-MCNC: 4.5 G/DL (ref 3.6–5.1)
ALP SERPL-CCNC: 66 U/L (ref 31–125)
ALT SERPL-CCNC: 23 U/L (ref 6–29)
ANION GAP SERPL CALCULATED.4IONS-SCNC: 11 MMOL/L (CALC) (ref 7–17)
AST SERPL-CCNC: 17 U/L (ref 10–30)
BILIRUB SERPL-MCNC: 0.3 MG/DL (ref 0.2–1.2)
BUN SERPL-MCNC: 12 MG/DL (ref 7–25)
CALCIUM SERPL-MCNC: 9.6 MG/DL (ref 8.6–10.2)
CHLORIDE SERPL-SCNC: 103 MMOL/L (ref 98–110)
CHOLEST SERPL-MCNC: 184 MG/DL
CHOLEST/HDLC SERPL: 4.8 (CALC)
CO2 SERPL-SCNC: 25 MMOL/L (ref 20–32)
CREAT SERPL-MCNC: 0.72 MG/DL (ref 0.5–0.97)
EGFRCR SERPLBLD CKD-EPI 2021: 114 ML/MIN/1.73M2
ERYTHROCYTE [DISTWIDTH] IN BLOOD BY AUTOMATED COUNT: 12.6 % (ref 11–15)
GLUCOSE SERPL-MCNC: 105 MG/DL (ref 65–99)
HCT VFR BLD AUTO: 42.7 % (ref 35–45)
HDLC SERPL-MCNC: 38 MG/DL
HGB BLD-MCNC: 14 G/DL (ref 11.7–15.5)
LDLC SERPL CALC-MCNC: 120 MG/DL (CALC)
MCH RBC QN AUTO: 29 PG (ref 27–33)
MCHC RBC AUTO-ENTMCNC: 32.8 G/DL (ref 32–36)
MCV RBC AUTO: 88.4 FL (ref 80–100)
NONHDLC SERPL-MCNC: 146 MG/DL (CALC)
PLATELET # BLD AUTO: 342 THOUSAND/UL (ref 140–400)
PMV BLD REES-ECKER: 10.5 FL (ref 7.5–12.5)
POTASSIUM SERPL-SCNC: 4.5 MMOL/L (ref 3.5–5.3)
PROT SERPL-MCNC: 7.7 G/DL (ref 6.1–8.1)
RBC # BLD AUTO: 4.83 MILLION/UL (ref 3.8–5.1)
SODIUM SERPL-SCNC: 139 MMOL/L (ref 135–146)
TRIGL SERPL-MCNC: 146 MG/DL
WBC # BLD AUTO: 10 THOUSAND/UL (ref 3.8–10.8)

## 2025-04-17 PROCEDURE — 76830 TRANSVAGINAL US NON-OB: CPT

## 2025-04-17 PROCEDURE — 76830 TRANSVAGINAL US NON-OB: CPT | Performed by: STUDENT IN AN ORGANIZED HEALTH CARE EDUCATION/TRAINING PROGRAM

## 2025-04-20 LAB
C TRACH RRNA SPEC QL NAA+PROBE: NOT DETECTED
EST. AVERAGE GLUCOSE BLD GHB EST-MCNC: 126 MG/DL
EST. AVERAGE GLUCOSE BLD GHB EST-SCNC: 7 MMOL/L
HBA1C MFR BLD: 6 %
HBV SURFACE AG SERPL QL IA: NORMAL
HCV AB SERPL QL IA: NORMAL
HIV 1+2 AB+HIV1 P24 AG SERPL QL IA: NORMAL
MIS SERPL-MCNC: 2.42 NG/ML (ref 0.36–10.07)
N GONORRHOEA RRNA SPEC QL NAA+PROBE: NOT DETECTED
QUEST GC CT AMPLIFIED (ALWAYS MESSAGE): NORMAL
RUBV IGG SERPL IA-ACNC: 2.79 INDEX
T PALLIDUM AB SER QL IA: NEGATIVE
TSH SERPL-ACNC: 2.5 MIU/L
VZV IGG SER IA-ACNC: 13.8 S/CO

## 2025-04-21 ENCOUNTER — DOCUMENTATION (OUTPATIENT)
Dept: ENDOCRINOLOGY | Facility: CLINIC | Age: 35
End: 2025-04-21
Payer: COMMERCIAL

## 2025-04-21 DIAGNOSIS — R73.9 ELEVATED BLOOD SUGAR LEVEL: Primary | ICD-10-CM

## 2025-04-21 RX ORDER — METFORMIN HYDROCHLORIDE 500 MG/1
500 TABLET, EXTENDED RELEASE ORAL
Qty: 90 TABLET | Refills: 11 | Status: SHIPPED | OUTPATIENT
Start: 2025-04-21 | End: 2026-04-21

## 2025-04-21 NOTE — PROGRESS NOTES
Called patient to discuss elevated hga1c level. Plan to start on metformin. Reviewed instructions in detail. Patient in agreement.  Gely Hamlin 04/21/25 9:58 AM

## 2025-06-18 NOTE — PROGRESS NOTES
Patient ID: Suze Cohen is a 34 y.o. female.  Referring Physician: No referring provider defined for this encounter.  Primary Care Provider: No Assigned PCP Generic Provider, MD      Subjective    HPI  34 y.o.  with diagnosis of PCOS and CAH diagnosed June 2019, managed with Mirena IUD with response since July 2019. Serial EMBs have remained negative    Her Mirena IUD was removed 11/2024, it was placed 4/1/24. Her hysteroscopy and D&C were done 11/202/24 by Dr. Llamas. She is going to the bathroom normally and eating and drinking normally, denies abnormal vaginal discharge and spotting/bleeding. States she was on Letrozole without success, desires future fertility and has not seen fertility providers.    Interval History:  She was initiated on Metformin and it caused her to feel nausea and she stopped, and she is trying to loose weight and is having a hard time establishing care with a PCP. Suze experienced spotting and cramping 1 day last. Confirms she takes Provera, going to the bathroom normally and eating and drinking normally and denies lower extremity edema. Additionally we discussed her well woman care.    She denies fever, chills, chest pain, SOB, nausea, vomiting, diarrhea, constipation, dysuria, or any other concerning signs of symptoms.      July 2019: D&C confirmed CAH  Nov 2019: EMB negative  April 2020: EMB negative  10/2020: EMB negative  May 2021: EMB negative, chronic endometritis  Negative EMBs until 12/2023  Since 2023: Persistent hyperplasia without atypia, IUD temporarily in place during this time      She returns to Gyn Oncology for clearance prior to conception.      PMH: PCOS, depression/anxiety, hypothyroidism, BMI 47  PSH: D&C  OBGYN: G0, menarche at 13, oligomenorrhea and hyperplasia per HPI  FamHx: no history of gyn related malignancy, including breast, ovarian, endometrial, cervical or colon cancer. 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  Meds: MVI, Mirena IUD  NKDA    Review of Systems - Oncology     Objective   BSA: 2.93 meters squared  /86 (BP Location: Left arm, Patient Position: Sitting, BP Cuff Size: Large adult)   Pulse 93   Temp 36.5 °C (97.7 °F) (Core)   Resp 16   Wt (!) 174 kg (383 lb 14.4 oz)   SpO2 95%   BMI 55.08 kg/m²      Family History   Problem Relation Name Age of Onset    Hypertension Mother Trina Betts     Hyperlipidemia Mother Trina Betts     Breast cancer Mother Trina Betts     Hypertension Father James Cohen     Hyperlipidemia Father James Cohen     Diabetes Father James Cohen     Heart disease Father James Cohen     Other (ANXIETY AND DEPRESSION) Sister      Other (LEUKEMIA, IN REMISSION) Maternal Grandmother      Diabetes type II Maternal Grandfather      Other (CARDIAC DISORDER) Paternal Grandfather      Diabetes type II Paternal Grandfather         Suze Cohen  reports that she has never smoked. She has never used smokeless tobacco.  She  reports current alcohol use.  She  reports no history of drug use.    Physical Exam  Constitutional:       General: She is not in acute distress.     Appearance: Normal appearance. She is normal weight. She is not toxic-appearing.   HENT:      Head: Normocephalic.      Mouth/Throat:      Mouth: Mucous membranes are moist.      Pharynx: Oropharynx is clear.   Eyes:      Extraocular Movements: Extraocular movements intact.      Conjunctiva/sclera: Conjunctivae normal.      Pupils: Pupils are equal, round, and reactive to light.   Cardiovascular:      Rate and Rhythm: Normal rate and regular rhythm.      Heart sounds: Normal heart sounds. No murmur heard.     No friction rub. No gallop.   Pulmonary:      Effort: Pulmonary effort is normal.      Breath sounds: Normal breath sounds. No wheezing or rhonchi.   Abdominal:      General: Abdomen is flat. Bowel sounds are normal. There is no distension.      Palpations: Abdomen is soft.      Tenderness: There is no  abdominal tenderness.   Genitourinary:     Comments: Normal external genitalia without lesions or masses  Speculum Exam: Smooth vagina without lesions or masses, normal appearing cervix  without lesions or masses  EMB: Cervix cleansed with betadine x3, 2 passes with endometrial pipelle taken, uterus sounded to 9cm, patient tolerated procedure well  Bimanual examination: Smooth vagina without lesions or masses, smooth cervix without lesions or masses, normal uterus, adnexa   Musculoskeletal:         General: No swelling. Normal range of motion.      Cervical back: Normal range of motion.   Skin:     General: Skin is warm and dry.   Neurological:      General: No focal deficit present.      Mental Status: She is alert and oriented to person, place, and time.   Psychiatric:         Mood and Affect: Mood normal.         Behavior: Behavior normal.         Performance Status:  Asymptomatic    Assessment/Plan      Oncology History    No history exists.     34 y.o. with medically managed endometrial hyperplasia, interested in fertility-sparing treatment     # Endometrial hyperplasia  - Discussed the significance of atypical hyperplasia and its underlying risk of occult endometrial cancer  - Discussed management options including medical and surgical.   - She is interested in getting pregnant. Tried previously (3 years ago) with three cycles using letrozole, did not achieve pregnancy at that time  - IUD removed in November, still showing endometrial hyperplasia without atypia  - Discussed referral to [facility] and provera 10mg  - Plan for follow-up in 6 months  - Will work with [facility] to determine timing/feasibility of pregnancy  - EMB done today, will follow-up results      Scribe Attestation  By signing my name below, IPrincess, [place]   attest that this documentation has been prepared under the direction and in the presence of Suze Samayoa MD, MS.     Provider Attestation - Scribe documentation    All medical record  entries made by the Scribe were at my direction and personally dictated by me. I have reviewed the chart and agree that the record accurately reflects my personal performance of the history, physical exam, discussion and plan.    Suze Samayoa MD, MS

## 2025-06-19 ENCOUNTER — OFFICE VISIT (OUTPATIENT)
Dept: GYNECOLOGIC ONCOLOGY | Facility: CLINIC | Age: 35
End: 2025-06-19
Payer: COMMERCIAL

## 2025-06-19 VITALS
WEIGHT: 293 LBS | TEMPERATURE: 97.7 F | HEART RATE: 93 BPM | BODY MASS INDEX: 55.08 KG/M2 | OXYGEN SATURATION: 95 % | RESPIRATION RATE: 16 BRPM | DIASTOLIC BLOOD PRESSURE: 86 MMHG | SYSTOLIC BLOOD PRESSURE: 141 MMHG

## 2025-06-19 DIAGNOSIS — H40.9 GLAUCOMA, UNSPECIFIED GLAUCOMA TYPE, UNSPECIFIED LATERALITY: ICD-10-CM

## 2025-06-19 DIAGNOSIS — N85.00 ENDOMETRIAL HYPERPLASIA: ICD-10-CM

## 2025-06-19 DIAGNOSIS — K21.9 GASTROESOPHAGEAL REFLUX DISEASE, UNSPECIFIED WHETHER ESOPHAGITIS PRESENT: ICD-10-CM

## 2025-06-19 DIAGNOSIS — E66.01 OBESITY, MORBID (MULTI): Primary | ICD-10-CM

## 2025-06-19 PROCEDURE — 99214 OFFICE O/P EST MOD 30 MIN: CPT | Performed by: STUDENT IN AN ORGANIZED HEALTH CARE EDUCATION/TRAINING PROGRAM

## 2025-06-19 PROCEDURE — 58100 BIOPSY OF UTERUS LINING: CPT | Performed by: STUDENT IN AN ORGANIZED HEALTH CARE EDUCATION/TRAINING PROGRAM

## 2025-06-19 PROCEDURE — 1036F TOBACCO NON-USER: CPT | Performed by: STUDENT IN AN ORGANIZED HEALTH CARE EDUCATION/TRAINING PROGRAM

## 2025-06-19 ASSESSMENT — PAIN SCALES - GENERAL: PAINLEVEL_OUTOF10: 0-NO PAIN

## 2025-06-26 ENCOUNTER — TELEPHONE (OUTPATIENT)
Dept: GYNECOLOGIC ONCOLOGY | Facility: HOSPITAL | Age: 35
End: 2025-06-26
Payer: COMMERCIAL

## 2025-06-26 LAB
LABORATORY COMMENT REPORT: NORMAL
PATH REPORT.COMMENTS IMP SPEC: NORMAL
PATH REPORT.FINAL DX SPEC: NORMAL
PATH REPORT.GROSS SPEC: NORMAL
PATH REPORT.RELEVANT HX SPEC: NORMAL
PATH REPORT.TOTAL CANCER: NORMAL

## 2025-06-26 NOTE — TELEPHONE ENCOUNTER
Phoned patient to notify that endometrial biopsy is negative and Dr. Samayoa recommends follow up in 6 months.    Advised patient to keep appointment as scheduled in December.    Message left on patient voice mail with results and follow up recommendations.

## (undated) DEVICE — Device

## (undated) DEVICE — SOLUTION, INJECTION, USP, SODIUM CHLORIDE 0.9%, .9, NACL, 1000 ML, BAG

## (undated) DEVICE — CAP, SELF SEAL, GYNECOLOGY, F/4-10FR, ST

## (undated) DEVICE — GLOVE, SURGICAL, PROTEXIS PI , 5.5, PF, LF

## (undated) DEVICE — DEVICE, TISSUE REMOVAL, MYOSURE LITE (3PK)

## (undated) DEVICE — COVER HANDLE LIGHT, STERIS, BLUE, STERILE

## (undated) DEVICE — GLOVE, SURGICAL, PROTEXIS PI BLUE W/NEUTHERA, 6.0, PF, LF

## (undated) DEVICE — DEVICE, TISSUE REMOVAL, MYOSURE